# Patient Record
Sex: MALE | Race: WHITE | NOT HISPANIC OR LATINO | Employment: OTHER | URBAN - NONMETROPOLITAN AREA
[De-identification: names, ages, dates, MRNs, and addresses within clinical notes are randomized per-mention and may not be internally consistent; named-entity substitution may affect disease eponyms.]

---

## 2017-02-01 ENCOUNTER — OFFICE VISIT (OUTPATIENT)
Dept: NEUROLOGY | Facility: CLINIC | Age: 77
End: 2017-02-01

## 2017-02-01 VITALS
HEART RATE: 64 BPM | OXYGEN SATURATION: 96 % | DIASTOLIC BLOOD PRESSURE: 60 MMHG | SYSTOLIC BLOOD PRESSURE: 110 MMHG | WEIGHT: 187 LBS | HEIGHT: 67 IN | BODY MASS INDEX: 29.35 KG/M2

## 2017-02-01 DIAGNOSIS — G47.33 OSA (OBSTRUCTIVE SLEEP APNEA): Primary | ICD-10-CM

## 2017-02-01 PROCEDURE — 99203 OFFICE O/P NEW LOW 30 MIN: CPT | Performed by: NURSE PRACTITIONER

## 2017-02-01 RX ORDER — TOPIRAMATE 25 MG/1
25 TABLET ORAL SEE ADMIN INSTRUCTIONS
Qty: 120 TABLET | Refills: 2 | Status: SHIPPED | OUTPATIENT
Start: 2017-02-01 | End: 2017-04-11

## 2017-02-01 RX ORDER — LANSOPRAZOLE 30 MG/1
CAPSULE, DELAYED RELEASE ORAL
COMMUNITY
Start: 2016-12-06 | End: 2017-11-14 | Stop reason: SDUPTHER

## 2017-02-01 RX ORDER — SUCRALFATE 1 G/1
TABLET ORAL
COMMUNITY
Start: 2015-12-28 | End: 2020-12-07 | Stop reason: HOSPADM

## 2017-02-01 RX ORDER — AMITRIPTYLINE HYDROCHLORIDE 100 MG/1
TABLET, FILM COATED ORAL
COMMUNITY
Start: 2015-12-14 | End: 2017-11-14 | Stop reason: SDUPTHER

## 2017-02-01 RX ORDER — LANOLIN ALCOHOL/MO/W.PET/CERES
1000 CREAM (GRAM) TOPICAL DAILY
COMMUNITY

## 2017-02-01 RX ORDER — BUTALBITAL, ACETAMINOPHEN AND CAFFEINE 50; 325; 40 MG/1; MG/1; MG/1
1 TABLET ORAL EVERY 4 HOURS PRN
COMMUNITY
Start: 2015-12-28 | End: 2020-12-21 | Stop reason: HOSPADM

## 2017-02-01 NOTE — PROGRESS NOTES
Subjective   Sharifa Mercer is a 76 y.o. male    Chief Complaint   Patient presents with   • Headache     PRESENT X MANY YRS. NO TRIGGERS NOTED. CURRENTLY TAKING OTC TYLENOL THAT DOES NOT DO MUCH. ALSO TAKES THE FIORICET. REPORTS DAILY HEADACHES. STATES THAT HE WAITS AS LONG AS HE CAN BEFORE TAKING ANYTHING. IMITREX INJ HELPED IN THE PAST.       History of Present Illness     Patient is a very pleasant 76-year-old male in clinic today to establish for headaches.  He states he was at 15 years old when he began his headaches which was the same time that he went to [employer].  He was in the [employer] from [id number] and [employer] no injuries no head injuries but no reported chemical exposure.  Patient states headaches are always the same they're frontal and temporal onset but still and proceeds into the throbbing pain he has no vision or acute hearing changes except during his headache sounds and lights will irritate headache.  He does have nausea with it but no vomiting.  He was on Fioricet for years and states that that controlled his headaches but states he had to quit because it's a controlled substance.  He is using Tylenol over-the-counter and states that is not helping his headaches he reports gets to an intensity of 9/10 he is now having 3 migraines weekly they are not tapering off.  He states that there lasting for 1-2 days.  Which means he is having on average 24 migraines monthly as any neck or shoulder shoulder involvement with his headache and denies any heart problems.    Patient has a history of a lower extremity blood clot ×2 he is on Coumadin and states he is not having any difficulty with his follow-up.    Patient also he filled out an [place] score for sleepiness and had an elevated score.  When discussing with patient he does have witnessed apnea he has woken himself up gasping he does have snoring and he has excessive sleepiness and does not feel well rested upon waking.  Patient has  "symptoms of sleep apnea    The following portions of the patient's history were reviewed and updated as appropriate: allergies, current medications, past family history, past medical history, past social history, past surgical history and problem list.    Review of Systems   Constitutional: Positive for activity change and fatigue.   Eyes: Positive for photophobia.   Respiratory: Positive for apnea.    Musculoskeletal: Positive for arthralgias and myalgias.   Neurological: Positive for light-headedness and headaches.   Psychiatric/Behavioral: Positive for sleep disturbance.       Objective         Vitals:    02/01/17 1123   BP: 110/60   Pulse: 64   SpO2: 96%   Weight: 187 lb (84.8 kg)   Height: 67\" (170.2 cm)     GENERAL: Patient is pleasant, cooperative, appears to be stated age.  Body habitus is endomorphic.  SKIN AND EXTREMITIES:  No skin rashes or lesions are noted.  No cyanosis, clubbing or edema of the extremities.    HEAD:  Head is normocephalic and atraumatic.    NECK: Neck are non-tender without thyromegaly or adenopathy.  Carotic upstrokes are 1+/4.  No cranial or cervical bruits.  The neck is supple with a full range of motion.   CARDIOVASCULAR:  Regular rate and rhythm with normal S1 and S2 without rub or gallop.  RESPIRATORY:  Clear to auscultation without wheezes or crackle   ABDOMEN:  Soft and non-tender, positive bowel sound   BACK:  Back is straight without midline defect.    PSYCH:  Higher cortical function/mental status:  The patient is alert.  She is oriented x3 to time, place and person.  Recent and the remote memory appear normal.  The patient has a good fund of knowledge.  There is no visual or auditory hallucination or suicidal or homicidal ideation.  SPEECH:There is no gross evidence of aphasia, dysarthria or agnosia.      CRANIAL NERVES:    Extraocular movements are full and smooth with normal pursuits and saccades.  No nystagmus noted.  The face is symmetric. palate elevate symmetrically, " Tongue midline, positive gag reflex. The remainder of the cranial nerves are intact and symmetrical.    MOTOR: Strength is 5/5 throughout with normal tone and bulk with the following exceptions, 4/5 intrinsic muscles of the hands and feet.  No involuntary movements noted.    DTR: are 2/4 and symmetrical in the upper extremities, 2/4 and symmetrical at the knees   COORDINATION AND GAIT:  The patient walks with a narrow-based gait.  Patient is able to heel-toe and tandem walk forward  MUSCULOSKELETAL: Range of motion normal, no clubbing, cyanosis, or edema.  No joint swelling.     Results for orders placed or performed during the hospital encounter of 07/13/16   CBC (Hemogram)   Result Value Ref Range    WBC 4.8 4.8 - 10.8 THOUS    RBC 4.41 (L) 4.70 - 6.10 m/uL    Hemoglobin 13.9 (L) 14.0 - 18.0 g/dL    Hematocrit 41 (L) 42 - 52 %    MCV 92.5 80.0 - 94.0 fL    MCH 31.5 (H) 27.0 - 31.0 uug    MCHC 34.1 30.0 - 37.0 g/dL    RDW 12.0 11.5 - 14.5 %    Platelets 166 130 - 400 THOUS   Protime-INR   Result Value Ref Range    Protime 112.3 (H) 9.3 - 12.1 SEC    INR 11.0 (C) 0.9 - 1.1       I have personally reviewed the above lab    Assessment/Plan     1.  Migraine headache: Patient willing to start Topamax 25 mg twice a day ×1 week increase to 50 mg 1 by mouth twice a day reviewed with patient the migraine discussion at this time patient is choosing to use Tylenol over-the-counter for any breakthrough migraines we'll have the patient return to clinic in 4 weeks to see if there is any improvement.    Discussion:  Migraine headaches are a major public health problem affecting more than 28 million persons in this country. Nearly 25 percent of women and 9 percent of men experience disabling migraines.    Migraine treatment depends on the duration and severity of pain, associated symptoms, degree of disability, and initial response to therapy. A widely prescribed and effective class of medications for migraines is the 5-HT1  "receptor-specific agonists (\"triptans\"). Contraindications to their use include ischemic vascular conditions, vasospastic coronary disease, uncontrolled hypertension, or other significant cardiovascular disease.  Following appropriate management of acute migraine, patients should be evaluated for initiation of preventive therapy. Factors that should prompt consideration of preventive therapy include the occurrence of two or more migraines per month with disability lasting three or more days per month; failure of, contraindication for, or adverse events from acute treatments; use of abortive medication more than twice per week; and uncommon migraine conditions (e.g., hemiplegic migraine, migraine with prolonged aura, migrainous infarction). Patient preference and cost also should be considered. Evidence consistently supports the use of the beta blocker propranolol (Inderal) in migraine prophylaxis. Amitriptyline is a first-line agent for migraine prophylaxis and is the only antidepressant with consistent evidence supporting its effectiveness for this use. Divalproex (Depakote) and sodium valproate are well supported by evidence for use in migraine prevention. Topamax has also been studies for migraine prophlaxis in open label studies and double blind studies. Evidence does not support the use of diltiazem (Cardizem) in migraine prevention, and the evidence for several other calcium channel blockers, such as nifedipine (Procardia), is poor and suggests only modest effect  Menstrual Migraine can present a challenge to clinician. Estrogen withdrawal has been shown to precipitate migraine headaches, and a sustained elevated level of estrogen will postpone the migraine. Transdermal estrogen started just before menstruation can provide a sustained low level of estrogen, decreasing the degree of estrogen decline, and thus may prevent induction of migraines    2.  Excessive daytime sleepiness/snoring/apnea witnessed with " gasping/daytime fatigue: Discussed with patient these are all the symptoms of sleep apnea patient is willing to undergo a sleep study.

## 2017-02-23 DIAGNOSIS — G47.33 OSA (OBSTRUCTIVE SLEEP APNEA): Primary | ICD-10-CM

## 2017-03-02 ENCOUNTER — OFFICE VISIT (OUTPATIENT)
Dept: NEUROLOGY | Facility: CLINIC | Age: 77
End: 2017-03-02

## 2017-03-02 VITALS
HEIGHT: 67 IN | DIASTOLIC BLOOD PRESSURE: 60 MMHG | WEIGHT: 187 LBS | HEART RATE: 62 BPM | OXYGEN SATURATION: 98 % | SYSTOLIC BLOOD PRESSURE: 122 MMHG | BODY MASS INDEX: 29.35 KG/M2

## 2017-03-02 DIAGNOSIS — G43.009 MIGRAINE WITHOUT AURA AND WITHOUT STATUS MIGRAINOSUS, NOT INTRACTABLE: Primary | ICD-10-CM

## 2017-03-02 PROCEDURE — 99213 OFFICE O/P EST LOW 20 MIN: CPT | Performed by: NURSE PRACTITIONER

## 2017-03-02 NOTE — PROGRESS NOTES
Subjective   Sharifa Mercer is a 76 y.o. male    Chief Complaint   Patient presents with   • Med Management     reports that he has had a few headaches since he started topamax and amitrip. states that he has had some dry mouth but no other issues with the medications.       History of Present Illness     Patient is a very pleasant 76-year-old male in clinic today to follow-up for his migraines.  Patient states that his migraines have been much improved since starting his Topamax and staying on his amitriptyline.  Patient states his headaches have been daily with at least 6 migraines a month at a pain level of 9-10 out of 10 states now he's had 5 since his last visit these were just regular headaches they resolved with Tylenol any had no headache that was more intense than a 5 out of 10 and again relieved with Tylenol.  Patient very pleased with current treatment he does not wish to increase the Topamax any further patient does not want the side effects of word find issues or foggy brain.  Denies any chest pain no shortness of breath he does state that he's having some fatigue and is primary care is aware of the patient's fatigue and he will also be [facility] for work[facility] of fatigue.    PAST HX: Patient is a very pleasant 76-year-old male in clinic today to establish for headaches. He states he was at 15 years old when he began his headaches which was the same time that he went to the [employer]. He was in the [employer] from [id number] and [employer] no injuries no head injuries but no reported chemical exposure. Patient states headaches are always the same they're frontal and temporal onset but still and proceeds into the throbbing pain he has no vision or acute hearing changes except during his headache sounds and lights will irritate headache. He does have nausea with it but no vomiting. He was on Fioricet for years and states that that controlled his headaches but states he had to quit  "because it's a controlled substance. He is using Tylenol over-the-counter and states that is not helping his headaches he reports gets to an intensity of 9/10 he is now having 3 migraines weekly they are not tapering off. He states that there lasting for 1-2 days. Which means he is having on average 24 migraines monthly as any neck or shoulder shoulder involvement with his headache and denies any heart problems.     Patient has a history of a lower extremity blood clot ×2 he is on Coumadin and states he is not having any difficulty with his follow-up.     Patient also he filled out an [place] score for sleepiness and had an elevated score. When discussing with patient he does have witnessed apnea he has woken himself up gasping he does have snoring and he has excessive sleepiness and does not feel well rested upon waking. Patient has symptoms of sleep apnea    The following portions of the patient's history were reviewed and updated as appropriate: allergies, current medications, past family history, past medical history, past social history, past surgical history and problem list.    Review of Systems  Constitutional: Positive for activity change and fatigue.   Eyes: Positive for photophobia.   Respiratory: Positive for apnea.   Musculoskeletal: Positive for arthralgias and myalgias.   Neurological: Positive for light-headedness and headaches.   Psychiatric/Behavioral: Positive for sleep disturbance.   Objective         Vitals:    03/02/17 1305   BP: 122/60   Pulse: 62   SpO2: 98%   Weight: 187 lb (84.8 kg)   Height: 67\" (170.2 cm)     GENERAL: Patient is pleasant, cooperative, appears to be stated age. Body habitus is endomorphic.  SKIN AND EXTREMITIES: No skin rashes or lesions are noted. No cyanosis, clubbing or edema of the extremities.   HEAD: Head is normocephalic and atraumatic.   NECK: Neck are non-tender without thyromegaly or adenopathy. Carotic upstrokes are 1+/4. No cranial or cervical bruits. The neck " is supple with a full range of motion.   CARDIOVASCULAR: Regular rate and rhythm with normal S1 and S2 without rub or gallop.  RESPIRATORY: Clear to auscultation without wheezes or crackle   ABDOMEN: Soft and non-tender, positive bowel sound   BACK: Back is straight without midline defect.   PSYCH: Higher cortical function/mental status: The patient is alert. She is oriented x3 to time, place and person. Recent and the remote memory appear normal. The patient has a good fund of knowledge. There is no visual or auditory hallucination or suicidal or homicidal ideation.  SPEECH:There is no gross evidence of aphasia, dysarthria or agnosia.   CRANIAL NERVES: Extraocular movements are full and smooth with normal pursuits and saccades. No nystagmus noted. The face is symmetric. palate elevate symmetrically, Tongue midline, positive gag reflex. The remainder of the cranial nerves are intact and symmetrical.   MOTOR: Strength is 5/5 throughout with normal tone and bulk with the following exceptions, 4/5 intrinsic muscles of the hands and feet. No involuntary movements noted.   DTR: are 2/4 and symmetrical in the upper extremities, 2/4 and symmetrical at the knees   COORDINATION AND GAIT: The patient walks with a narrow-based gait. Patient is able to heel-toe and tandem walk forward  MUSCULOSKELETAL: Range of motion normal, no clubbing, cyanosis, or edema. No joint swelling.       Results for orders placed or performed during the hospital encounter of 07/13/16   CBC (Hemogram)   Result Value Ref Range    WBC 4.8 4.8 - 10.8 THOUS    RBC 4.41 (L) 4.70 - 6.10 m/uL    Hemoglobin 13.9 (L) 14.0 - 18.0 g/dL    Hematocrit 41 (L) 42 - 52 %    MCV 92.5 80.0 - 94.0 fL    MCH 31.5 (H) 27.0 - 31.0 uug    MCHC 34.1 30.0 - 37.0 g/dL    RDW 12.0 11.5 - 14.5 %    Platelets 166 130 - 400 THOUS   Protime-INR   Result Value Ref Range    Protime 112.3 (H) 9.3 - 12.1 SEC    INR 11.0 (C) 0.9 - 1.1         I have personally reviewed the above labs  "    Assessment/Plan   1. Migraine headache: Patient willing to continue Topamax 50 mg 1 by mouth twice a day reviewed with patient the migraine discussion at this time patient is choosing to use Tylenol over-the-counter for any breakthrough migraines we'll have the patient return to clinic in 6 months        Discussion:  Migraine headaches are a major public health problem affecting more than 28 million persons in this country. Nearly 25 percent of women and 9 percent of men experience disabling migraines.    Migraine treatment depends on the duration and severity of pain, associated symptoms, degree of disability, and initial response to therapy. A widely prescribed and effective class of medications for migraines is the 5-HT1 receptor-specific agonists (\"triptans\"). Contraindications to their use include ischemic vascular conditions, vasospastic coronary disease, uncontrolled hypertension, or other significant cardiovascular disease.  Following appropriate management of acute migraine, patients should be evaluated for initiation of preventive therapy. Factors that should prompt consideration of preventive therapy include the occurrence of two or more migraines per month with disability lasting three or more days per month; failure of, contraindication for, or adverse events from acute treatments; use of abortive medication more than twice per week; and uncommon migraine conditions (e.g., hemiplegic migraine, migraine with prolonged aura, migrainous infarction). Patient preference and cost also should be considered. Evidence consistently supports the use of the beta blocker propranolol (Inderal) in migraine prophylaxis. Amitriptyline is a first-line agent for migraine prophylaxis and is the only antidepressant with consistent evidence supporting its effectiveness for this use. Divalproex (Depakote) and sodium valproate are well supported by evidence for use in migraine prevention. Topamax has also been studies for " migraine prophlaxis in open label studies and double blind studies. Evidence does not support the use of diltiazem (Cardizem) in migraine prevention, and the evidence for several other calcium channel blockers, such as nifedipine (Procardia), is poor and suggests only modest effect  Menstrual Migraine can present a challenge to clinician. Estrogen withdrawal has been shown to precipitate migraine headaches, and a sustained elevated level of estrogen will postpone the migraine. Transdermal estrogen started just before menstruation can provide a sustained low level of estrogen, decreasing the degree of estrogen decline, and thus may prevent induction of migraines     2.  Excessive daytime sleepiness/snoring/apnea witnessed with gasping/daytime fatigue: Discussed with patient these are all the symptoms of sleep apnea patient is willing to undergo a sleep study.Sleep study pending

## 2017-03-06 ENCOUNTER — HOSPITAL ENCOUNTER (OUTPATIENT)
Dept: SLEEP MEDICINE | Facility: HOSPITAL | Age: 77
Setting detail: THERAPIES SERIES
End: 2017-03-06

## 2017-03-15 ENCOUNTER — HOSPITAL ENCOUNTER (OUTPATIENT)
Dept: SLEEP MEDICINE | Facility: HOSPITAL | Age: 77
Setting detail: THERAPIES SERIES
End: 2017-03-15

## 2017-03-15 DIAGNOSIS — G47.33 OSA (OBSTRUCTIVE SLEEP APNEA): ICD-10-CM

## 2017-03-15 PROCEDURE — 95806 SLEEP STUDY UNATT&RESP EFFT: CPT | Performed by: PSYCHIATRY & NEUROLOGY

## 2017-04-06 ENCOUNTER — OFFICE VISIT (OUTPATIENT)
Dept: NEUROLOGY | Facility: CLINIC | Age: 77
End: 2017-04-06

## 2017-04-06 VITALS
DIASTOLIC BLOOD PRESSURE: 60 MMHG | SYSTOLIC BLOOD PRESSURE: 106 MMHG | HEIGHT: 67 IN | WEIGHT: 187 LBS | BODY MASS INDEX: 29.35 KG/M2

## 2017-04-06 DIAGNOSIS — G47.33 OSA (OBSTRUCTIVE SLEEP APNEA): Primary | ICD-10-CM

## 2017-04-06 DIAGNOSIS — G43.009 MIGRAINE WITHOUT AURA AND WITHOUT STATUS MIGRAINOSUS, NOT INTRACTABLE: ICD-10-CM

## 2017-04-06 PROCEDURE — 99213 OFFICE O/P EST LOW 20 MIN: CPT | Performed by: NURSE PRACTITIONER

## 2017-04-06 NOTE — PROGRESS NOTES
Subjective   Sharifa Mercer is a 76 y.o. male    Chief Complaint   Patient presents with   • Sleep Apnea     here to discuss sleep report.   • Headache       History of Present Illness     Patient is very pleasant 76-year-old female in clinic today to follow-up for her migraines as well as sleep apnea.  Patient had home sleep study performed which does show significant have breathing difficulties with obstructive apnea.  Patient is not interested in treating her sleep apnea at this time is of untreated sleep apnea have been reviewed with patient.  Patient continues to have her headaches she states that the Topamax 50 mg twice a day has decreased the frequency and intensity however she is still having 3-4 migraines a month and the pain scale she states does still range anywhere from 5-9 out of 10 just not as many headaches at level IX since Topamax.    PAST HX: Patient is a very pleasant 76-year-old male in clinic today to follow-up for his migraines. Patient states that his migraines have been much improved since starting his Topamax and staying on his amitriptyline. Patient states his headaches have been daily with at least 6 migraines a month at a pain level of 9-10 out of 10 states now he's had 5 since his last visit these were just regular headaches they resolved with Tylenol any had no headache that was more intense than a 5 out of 10 and again relieved with Tylenol. Patient very pleased with current treatment he does not wish to increase the Topamax any further patient does not want the side effects of word find issues or foggy brain. Denies any chest pain no shortness of breath he does state that he's having some fatigue and is primary care is aware of the patient's fatigue and he will also be [facility] for [facility] of fatigue.    The following portions of the patient's history were reviewed and updated as appropriate: allergies, current medications, past family history, past medical  "history, past social history, past surgical history and problem list.    Review of Systems  Constitutional: Positive for activity change and fatigue.   Eyes: Positive for photophobia.   Respiratory: Positive for apnea.   Musculoskeletal: Positive for arthralgias and myalgias.   Neurological: Positive for light-headedness and headaches.   Psychiatric/Behavioral: Positive for sleep disturbance.   Objective         Vitals:    04/06/17 1419   BP: 106/60   Weight: 187 lb (84.8 kg)   Height: 67\" (170.2 cm)     GENERAL: Patient is pleasant, cooperative, appears to be stated age. Body habitus is endomorphic.  SKIN AND EXTREMITIES: No skin rashes or lesions are noted. No cyanosis, clubbing or edema of the extremities.   HEAD: Head is normocephalic and atraumatic.  Positive hearing aids with continued hearing difficulties.  NECK: Neck are non-tender without thyromegaly or adenopathy. Carotic upstrokes are 1+/4. No cranial or cervical bruits. The neck is supple with a full range of motion.   CARDIOVASCULAR: Regular rate and rhythm with normal S1 and S2 without rub or gallop.  RESPIRATORY: Clear to auscultation without wheezes or crackle   ABDOMEN: Soft and non-tender, positive bowel sound   BACK: Back is straight without midline defect.   PSYCH: Higher cortical function/mental status: The patient is alert. She is oriented x3 to time, place and person. Recent and the remote memory appear normal. The patient has a good fund of knowledge. There is no visual or auditory hallucination or suicidal or homicidal ideation.  SPEECH:There is no gross evidence of aphasia, dysarthria or agnosia.   CRANIAL NERVES: Extraocular movements are full and smooth with normal pursuits and saccades. No nystagmus noted. The face is symmetric. palate elevate symmetrically, Tongue midline, positive gag reflex. The remainder of the cranial nerves are intact and symmetrical.   MOTOR: Strength is 5/5 throughout with normal tone and bulk with the following " "exceptions, 4/5 intrinsic muscles of the hands and feet. No involuntary movements noted.   DTR: are 2/4 and symmetrical in the upper extremities, 2/4 and symmetrical at the knees   COORDINATION AND GAIT: The patient walks with a narrow-based gait. Patient is able to heel-toe and tandem walk forward  MUSCULOSKELETAL: Range of motion normal, no clubbing, cyanosis, or edema. No joint swelling.       Results for orders placed or performed during the hospital encounter of 07/13/16   CBC (Hemogram)   Result Value Ref Range    WBC 4.8 4.8 - 10.8 THOUS    RBC 4.41 (L) 4.70 - 6.10 m/uL    Hemoglobin 13.9 (L) 14.0 - 18.0 g/dL    Hematocrit 41 (L) 42 - 52 %    MCV 92.5 80.0 - 94.0 fL    MCH 31.5 (H) 27.0 - 31.0 uug    MCHC 34.1 30.0 - 37.0 g/dL    RDW 12.0 11.5 - 14.5 %    Platelets 166 130 - 400 THOUS   Protime-INR   Result Value Ref Range    Protime 112.3 (H) 9.3 - 12.1 SEC    INR 11.0 (C) 0.9 - 1.1         I have personally reviewed the above labs    Assessment/Plan   1. Migraine headache: Patient willing to increase Topamax 100 mg 1 by mouth twice a day reviewed with patient the migraine discussion at this time patient is choosing to use Tylenol over-the-counter for any breakthrough migraines we'll have the patient return to clinic in 6 months      Discussion:  Migraine headaches are a major public health problem affecting more than 28 million persons in this country. Nearly 25 percent of women and 9 percent of men experience disabling migraines.    Migraine treatment depends on the duration and severity of pain, associated symptoms, degree of disability, and initial response to therapy. A widely prescribed and effective class of medications for migraines is the 5-HT1 receptor-specific agonists (\"triptans\"). Contraindications to their use include ischemic vascular conditions, vasospastic coronary disease, uncontrolled hypertension, or other significant cardiovascular disease.  Following appropriate management of acute " migraine, patients should be evaluated for initiation of preventive therapy. Factors that should prompt consideration of preventive therapy include the occurrence of two or more migraines per month with disability lasting three or more days per month; failure of, contraindication for, or adverse events from acute treatments; use of abortive medication more than twice per week; and uncommon migraine conditions (e.g., hemiplegic migraine, migraine with prolonged aura, migrainous infarction). Patient preference and cost also should be considered. Evidence consistently supports the use of the beta blocker propranolol (Inderal) in migraine prophylaxis. Amitriptyline is a first-line agent for migraine prophylaxis and is the only antidepressant with consistent evidence supporting its effectiveness for this use. Divalproex (Depakote) and sodium valproate are well supported by evidence for use in migraine prevention. Topamax has also been studies for migraine prophlaxis in open label studies and double blind studies. Evidence does not support the use of diltiazem (Cardizem) in migraine prevention, and the evidence for several other calcium channel blockers, such as nifedipine (Procardia), is poor and suggests only modest effect  Menstrual Migraine can present a challenge to clinician. Estrogen withdrawal has been shown to precipitate migraine headaches, and a sustained elevated level of estrogen will postpone the migraine. Transdermal estrogen started just before menstruation can provide a sustained low level of estrogen, decreasing the degree of estrogen decline, and thus may prevent induction of migraines      2.  RAMONITA: Discussed in detail sleep apnea with patient however she is not interested in treating her sleep apnea.  It's that she would not be able to use the mask but that she will speak with her family members about up at this time there'll be no treatment.    I have counseled patient extensively on Sleep Hygiene  including regular sleep wake schedule and stimulus control therapy.  I have also discussed the importance of weight reduction because 10% reduction in body weight can reduce sleep apnea by 50 %. We have also discussed abstaining from smoking and drinking.  I have explained to patient that obstructive apnea episode is defined as the absence of airflow for at least 10 seconds.  Sleep apnea is usually accompanied by snoring, disturbed sleep, and daytime sleepiness. Patients with micrognathia, retrognathia, enlarged tonsils, tongue enlargement, and acromegaly are especially predisposed to obstructive sleep apnea. Abnormalities or weakness in the muscles can also contribute to obstructive sleep apnea. Obesity can also contribute to sleep apnea.     Sleep apnea can lead to a number of complications, ranging from daytime sleepiness to possible increased risk of cardiovascular risks.   Daytime sleepiness is the most serious.  Daytime sleepiness can also increase the risk for accident-related injuries. Several studies have suggested that people with sleep apnea have two to three times as many car accidents, and five to seven times the risk for multiple accidents.   A number of cardiovascular diseases -- including high blood pressure, heart failure, stroke, and heart arrhythmias -- have an association with obstructive sleep apnea.   Up to a third of patients with heart failure also have sleep apnea. Both central and obstructive sleep apnea are linked with heart failure. Obstructive sleep apnea is also noted to be associated with type 2 diabetes according to Dr. Espinoza at [facility].  The best treatment for symptomatic obstructive sleep apnea is continuous positive airflow pressure (CPAP). Bilevel positive airway pressure (BPAP) systems may be particularly helpful for patients with coexisting lung disease and those with excessive levels of carbon dioxide.  Other treatment options including UPPP surgery, LAUP surgery,  radiofrequency somnoplasty and dental appliances such [place]av[place] or Clearway.

## 2017-04-13 PROBLEM — G43.009 MIGRAINE WITHOUT AURA AND WITHOUT STATUS MIGRAINOSUS, NOT INTRACTABLE: Status: ACTIVE | Noted: 2017-04-13

## 2017-10-02 ENCOUNTER — OFFICE VISIT (OUTPATIENT)
Dept: NEUROLOGY | Facility: CLINIC | Age: 77
End: 2017-10-02

## 2017-10-02 VITALS
DIASTOLIC BLOOD PRESSURE: 76 MMHG | OXYGEN SATURATION: 97 % | BODY MASS INDEX: 29.35 KG/M2 | WEIGHT: 187 LBS | SYSTOLIC BLOOD PRESSURE: 104 MMHG | HEART RATE: 64 BPM | HEIGHT: 67 IN

## 2017-10-02 DIAGNOSIS — G47.33 OSA (OBSTRUCTIVE SLEEP APNEA): ICD-10-CM

## 2017-10-02 DIAGNOSIS — G43.009 MIGRAINE WITHOUT AURA AND WITHOUT STATUS MIGRAINOSUS, NOT INTRACTABLE: Primary | ICD-10-CM

## 2017-10-02 PROCEDURE — 99214 OFFICE O/P EST MOD 30 MIN: CPT | Performed by: NURSE PRACTITIONER

## 2017-10-02 NOTE — PROGRESS NOTES
Subjective   Sharifa Mercer is a 77 y.o. male    Chief Complaint   Patient presents with   • Follow-up     Pt is here today for a 6 month FU for RAMONITA. Pt states that he has been sleeping alright, no compliants at this time.        History of Present Illness     Pt is a very pleasant 77 yr old male in clinic to follow up RAMONITA. Pt recommended CPAP in the past and declined. Discussed >15 minutes in clinic risk of untreated RAMONITA. Pt states he understands but will not use CPAP.   Pt with Hx Migraines states very pleased @ this time migraines controlled.    PAST HX: Patient is a very pleasant 76-year-old male in clinic today to follow-up for his migraines. Patient states that his migraines have been much improved since starting his Topamax and staying on his amitriptyline. Patient states his headaches have been daily with at least 6 migraines a month at a pain level of 9-10 out of 10 states now he's had 5 since his last visit these were just regular headaches they resolved with Tylenol any had no headache that was more intense than a 5 out of 10 and again relieved with Tylenol. Patient very pleased with current treatment he does not wish to increase the Topamax any further patient does not want the side effects of word find issues or foggy brain. Denies any chest pain no shortness of breath he does state that he's having some fatigue and is primary care is aware of the patient's fatigue and he will also be seen [facility] for workup of fatigue.       The following portions of the patient's history were reviewed and updated as appropriate: allergies, current medications, past family history, past medical history, past social history, past surgical history and problem list.    Review of Systems  Constitutional: Positive for activity change and fatigue.   Eyes: Positive for photophobia.   Respiratory: Positive for apnea.   Musculoskeletal: Positive for arthralgias and myalgias.   Neurological: Positive for  "light-headedness and headaches.   Psychiatric/Behavioral: Positive for sleep disturbance.   Objective         Vitals:    10/02/17 1131   BP: 104/76   Pulse: 64   SpO2: 97%   Weight: 187 lb (84.8 kg)   Height: 67\" (170.2 cm)     GENERAL: Patient is pleasant, cooperative, appears to be stated age.  Body habitus is endomorphic.  HEAD:  Head is normocephalic and atraumatic.    NECK: Neck are non-tender without thyromegaly or adenopathy.  Carotic upstrokes are 1+/4.  No cranial or cervical bruits.  The neck is supple with a full range of motion.   ENT: palate elevate symmetrically, no evidence of high arch palate, tongue midline erythema in posterior pharynx, Mallampati Classification Class III   CARDIOVASCULAR:  Regular rate and rhythm with normal S1 and S2 without rub or gallop.  RESPIRATORY:  Clear to auscultation without wheezes or crackle   PSYCH:  Higher cortical function/mental status:  The patient is alert.  He  is oriented x3 to time, place and person.  Recent and the remote memory appear normal.  The patient has a good fund of knowledge.  There is no visual or auditory hallucination or suicidal or homicidal ideation.  SPEECH:There is no gross evidence of aphasia, dysarthria or agnosia.      COORDINATION AND GAIT:  The patient walks with a narrow-based gait.    Results for orders placed or performed during the hospital encounter of 07/13/16   CBC (Hemogram)   Result Value Ref Range    WBC 4.8 4.8 - 10.8 THOUS    RBC 4.41 (L) 4.70 - 6.10 m/uL    Hemoglobin 13.9 (L) 14.0 - 18.0 g/dL    Hematocrit 41 (L) 42 - 52 %    MCV 92.5 80.0 - 94.0 fL    MCH 31.5 (H) 27.0 - 31.0 uug    MCHC 34.1 30.0 - 37.0 g/dL    RDW 12.0 11.5 - 14.5 %    Platelets 166 130 - 400 THOUS   Protime-INR   Result Value Ref Range    Protime 112.3 (H) 9.3 - 12.1 SEC    INR 11.0 (C) 0.9 - 1.1       I have personally reviewed the above labs and imaging studies.    Assessment/Plan     1. Migraine headache:Continue elavil 100mg QHS. Pt states stable " "patient is choosing to use Tylenol over-the-counter for any breakthrough migraines we'll have the patient return to clinic in 12 months      Discussion:  Migraine headaches are a major public health problem affecting more than 28 million persons in this country. Nearly 25 percent of women and 9 percent of men experience disabling migraines.    Migraine treatment depends on the duration and severity of pain, associated symptoms, degree of disability, and initial response to therapy. A widely prescribed and effective class of medications for migraines is the 5-HT1 receptor-specific agonists (\"triptans\"). Contraindications to their use include ischemic vascular conditions, vasospastic coronary disease, uncontrolled hypertension, or other significant cardiovascular disease.  Following appropriate management of acute migraine, patients should be evaluated for initiation of preventive therapy. Factors that should prompt consideration of preventive therapy include the occurrence of two or more migraines per month with disability lasting three or more days per month; failure of, contraindication for, or adverse events from acute treatments; use of abortive medication more than twice per week; and uncommon migraine conditions (e.g., hemiplegic migraine, migraine with prolonged aura, migrainous infarction). Patient preference and cost also should be considered. Evidence consistently supports the use of the beta blocker propranolol (Inderal) in migraine prophylaxis. Amitriptyline is a first-line agent for migraine prophylaxis and is the only antidepressant with consistent evidence supporting its effectiveness for this use. Divalproex (Depakote) and sodium valproate are well supported by evidence for use in migraine prevention. Topamax has also been studies for migraine prophlaxis in open label studies and double blind studies. Evidence does not support the use of diltiazem (Cardizem) in migraine prevention, and the evidence " for several other calcium channel blockers, such as nifedipine (Procardia), is poor and suggests only modest effect  Menstrual Migraine can present a challenge to clinician. Estrogen withdrawal has been shown to precipitate migraine headaches, and a sustained elevated level of estrogen will postpone the migraine. Transdermal estrogen started just before menstruation can provide a sustained low level of estrogen, decreasing the degree of estrogen decline, and thus may prevent induction of migraines      2.  RAMONITA: Discussed in detail sleep apnea with patient however he is not interested in treating  sleep apnea.  It's that he would not be able to use the mask . Discussed number of different masks to trial. Pt continues to decline       I have counseled patient extensively on Sleep Hygiene including regular sleep wake schedule and stimulus control therapy.  I have also discussed the importance of weight reduction because 10% reduction in body weight can reduce sleep apnea by 50 %. We have also discussed abstaining from smoking and drinking.  I have explained to patient that obstructive apnea episode is defined as the absence of airflow for at least 10 seconds.  Sleep apnea is usually accompanied by snoring, disturbed sleep, and daytime sleepiness. Patients with micrognathia, retrognathia, enlarged tonsils, tongue enlargement, and acromegaly are especially predisposed to obstructive sleep apnea. Abnormalities or weakness in the muscles can also contribute to obstructive sleep apnea. Obesity can also contribute to sleep apnea.      Sleep apnea can lead to a number of complications, ranging from daytime sleepiness to possible increased risk of cardiovascular risks.   Daytime sleepiness is the most serious.  Daytime sleepiness can also increase the risk for accident-related injuries. Several studies have suggested that people with sleep apnea have two to three times as many car accidents, and five to seven times the risk  for multiple accidents.   A number of cardiovascular diseases -- including high blood pressure, heart failure, stroke, and heart arrhythmias -- have an association with obstructive sleep apnea.   Up to a third of patients with heart failure also have sleep apnea. Both central and obstructive sleep apnea are linked with heart failure. Obstructive sleep apnea is also noted to be associated with type 2 diabetes according to Dr. Espinoza at [facility].  The best treatment for symptomatic obstructive sleep apnea is continuous positive airflow pressure (CPAP). Bilevel positive airway pressure (BPAP) systems may be particularly helpful for patients with coexisting lung disease and those with excessive levels of carbon dioxide.  Other treatment options including UPPP surgery, LAUP surgery, radiofrequency somnoplasty and dental appliances such [place] or Clearway.

## 2017-11-14 ENCOUNTER — OFFICE VISIT (OUTPATIENT)
Dept: INTERNAL MEDICINE | Facility: CLINIC | Age: 77
End: 2017-11-14

## 2017-11-14 VITALS
OXYGEN SATURATION: 95 % | TEMPERATURE: 97.6 F | SYSTOLIC BLOOD PRESSURE: 110 MMHG | BODY MASS INDEX: 27.15 KG/M2 | WEIGHT: 173 LBS | DIASTOLIC BLOOD PRESSURE: 70 MMHG | HEIGHT: 67 IN | HEART RATE: 54 BPM

## 2017-11-14 DIAGNOSIS — E78.5 HYPERLIPIDEMIA LDL GOAL <70: ICD-10-CM

## 2017-11-14 DIAGNOSIS — J30.89 ENVIRONMENTAL AND SEASONAL ALLERGIES: ICD-10-CM

## 2017-11-14 DIAGNOSIS — Z79.899 ENCOUNTER FOR MONITORING LONG-TERM PROTON PUMP INHIBITOR THERAPY: ICD-10-CM

## 2017-11-14 DIAGNOSIS — Z51.81 ENCOUNTER FOR MONITORING LONG-TERM PROTON PUMP INHIBITOR THERAPY: ICD-10-CM

## 2017-11-14 DIAGNOSIS — IMO0002 CHRONIC MIGRAINE: ICD-10-CM

## 2017-11-14 DIAGNOSIS — D64.9 ANEMIA OF UNKNOWN ETIOLOGY: Primary | ICD-10-CM

## 2017-11-14 PROCEDURE — 99213 OFFICE O/P EST LOW 20 MIN: CPT | Performed by: FAMILY MEDICINE

## 2017-11-14 RX ORDER — TOPIRAMATE 25 MG/1
25 CAPSULE, COATED PELLETS ORAL 2 TIMES DAILY
COMMUNITY
End: 2018-03-29 | Stop reason: SDUPTHER

## 2017-11-14 RX ORDER — LORATADINE 10 MG/1
10 TABLET ORAL DAILY
Qty: 90 TABLET | Refills: 3 | Status: SHIPPED | OUTPATIENT
Start: 2017-11-14

## 2017-11-14 RX ORDER — AMITRIPTYLINE HYDROCHLORIDE 100 MG/1
100 TABLET, FILM COATED ORAL
Qty: 90 TABLET | Refills: 3 | Status: SHIPPED | OUTPATIENT
Start: 2017-11-14 | End: 2020-10-30 | Stop reason: ALTCHOICE

## 2017-11-14 RX ORDER — IRON POLYSACCHARIDE COMPLEX 150 MG
CAPSULE ORAL
Qty: 180 CAPSULE | Refills: 1 | Status: SHIPPED | OUTPATIENT
Start: 2017-11-14 | End: 2018-10-04

## 2017-11-14 NOTE — PROGRESS NOTES
Subjective    Sharifa Mercer is a 77 y.o. male here for:  Chief Complaint   Patient presents with   • NEW PATIENT     NEW PATIENT - ESTABLISH CARE      History of Present Illness     Here to establish care. I also care for his wife. Needs medicines refilled for his migraines. Takes elavil at night for these. Also takes xanax at night to help with sleep, works well. He has issues with heartburn and takes chronic PPI therapy. Has been receiving vitamin B12 shots at another facility. Also on iron, would like it refilled. He is not sure if he still needs to take it. Mentions nose has been a bit runny. He took Claritin previously, but not presently, and it helped with allergies. He's willing to try it again.    The following portions of the patient's history were reviewed and updated as appropriate: allergies, current medications, past family history, past medical history, past social history, past surgical history and problem list.    Review of Systems   HENT: Positive for hearing loss and rhinorrhea.    All other systems reviewed and are negative.      Vitals:    11/14/17 1430   BP: 110/70   Pulse: 54   Temp: 97.6 °F (36.4 °C)   SpO2: 95%       Objective   Physical Exam   Constitutional: He is oriented to person, place, and time. Vital signs are normal. He appears well-developed and well-nourished. He is active. He does not have a sickly appearance. He does not appear ill.   Appears stated age. Well groomed.   HENT:   Head: Normocephalic and atraumatic.   Right Ear: Decreased hearing is noted.   Left Ear: Decreased hearing is noted.   Nose: Nose normal.   Mouth/Throat: Mucous membranes are not dry.   Eyes: EOM and lids are normal. Pupils are equal, round, and reactive to light. No scleral icterus.   Cardiovascular: Normal rate, regular rhythm and normal heart sounds.  Exam reveals no gallop and no friction rub.    No murmur heard.  Pulmonary/Chest: Effort normal and breath sounds normal.   Musculoskeletal: He exhibits  no deformity.   Neurological: He is alert and oriented to person, place, and time. He displays no tremor. No cranial nerve deficit. Gait normal.   Skin: Skin is warm. He is not diaphoretic. No cyanosis. Nails show no clubbing.   Psychiatric: He has a normal mood and affect. His speech is normal and behavior is normal. Judgment and thought content normal.   Somewhat difficult historian, quiet gentleman, but pleasant.   Nursing note and vitals reviewed.      Assessment/Plan   Sharifa was seen today for new patient, hyperlipidemia and migraine.    Diagnoses and all orders for this visit:    Anemia of unknown etiology  -     iron polysaccharides (FERREX 150) 150 MG capsule; TAKE 1 CAPSULE BY MOUTH 2 (TWO) TIMES A DAY  -     CBC & Differential  -     Vitamin B12 & Folate  -     Iron Profile  -     Ferritin    Hyperlipidemia LDL goal <70  -     Lipid Panel  -     Comprehensive Metabolic Panel    Chronic migraine  -     amitriptyline (ELAVIL) 100 MG tablet; Take 1 tablet by mouth every night at bedtime.    Encounter for monitoring long-term proton pump inhibitor therapy  -     Vitamin B12 & Folate  -     Magnesium    Environmental and seasonal allergies  -     loratadine (CLARITIN) 10 MG tablet; Take 1 tablet by mouth Daily as needed for post nasal drainage, runny nose and sneezing      Records from previous provider requested.             Karena Spivey MD

## 2017-11-15 LAB
ALBUMIN SERPL-MCNC: 3.9 G/DL (ref 3.5–5)
ALBUMIN/GLOB SERPL: 1.6 G/DL (ref 1–2)
ALP SERPL-CCNC: 81 U/L (ref 38–126)
ALT SERPL-CCNC: 36 U/L (ref 13–69)
AST SERPL-CCNC: 35 U/L (ref 15–46)
BASOPHILS # BLD AUTO: 0.01 10*3/MM3 (ref 0–0.2)
BASOPHILS NFR BLD AUTO: 0.1 % (ref 0–2.5)
BILIRUB SERPL-MCNC: 1.3 MG/DL (ref 0.2–1.3)
BUN SERPL-MCNC: 15 MG/DL (ref 7–20)
BUN/CREAT SERPL: 16.7 (ref 6.3–21.9)
CALCIUM SERPL-MCNC: 9.5 MG/DL (ref 8.4–10.2)
CHLORIDE SERPL-SCNC: 107 MMOL/L (ref 98–107)
CHOLEST SERPL-MCNC: 112 MG/DL (ref 0–199)
CO2 SERPL-SCNC: 24 MMOL/L (ref 26–30)
CREAT SERPL-MCNC: 0.9 MG/DL (ref 0.6–1.3)
EOSINOPHIL # BLD AUTO: 0.14 10*3/MM3 (ref 0–0.7)
EOSINOPHIL NFR BLD AUTO: 2.1 % (ref 0–7)
ERYTHROCYTE [DISTWIDTH] IN BLOOD BY AUTOMATED COUNT: 12.4 % (ref 11.5–14.5)
FERRITIN SERPL-MCNC: 107 NG/ML (ref 17.9–464)
FOLATE SERPL-MCNC: 13 NG/ML
GFR SERPLBLD CREATININE-BSD FMLA CKD-EPI: 81 ML/MIN/1.73
GFR SERPLBLD CREATININE-BSD FMLA CKD-EPI: 99 ML/MIN/1.73
GLOBULIN SER CALC-MCNC: 2.5 GM/DL
GLUCOSE SERPL-MCNC: 84 MG/DL (ref 74–98)
HCT VFR BLD AUTO: 43.1 % (ref 42–52)
HDLC SERPL-MCNC: 58 MG/DL (ref 40–60)
HGB BLD-MCNC: 14.5 G/DL (ref 14–18)
IMM GRANULOCYTES # BLD: 0.03 10*3/MM3 (ref 0–0.06)
IMM GRANULOCYTES NFR BLD: 0.4 % (ref 0–0.6)
IRON SATN MFR SERPL: 28 % (ref 11–46)
IRON SERPL-MCNC: 69 MCG/DL (ref 37–181)
LDLC SERPL CALC-MCNC: 43 MG/DL (ref 0–99)
LYMPHOCYTES # BLD AUTO: 1.4 10*3/MM3 (ref 0.6–3.4)
LYMPHOCYTES NFR BLD AUTO: 20.6 % (ref 10–50)
MAGNESIUM SERPL-MCNC: 2 MG/DL (ref 1.6–2.3)
MCH RBC QN AUTO: 31.5 PG (ref 27–31)
MCHC RBC AUTO-ENTMCNC: 33.6 G/DL (ref 30–37)
MCV RBC AUTO: 93.7 FL (ref 80–94)
MONOCYTES # BLD AUTO: 0.74 10*3/MM3 (ref 0–0.9)
MONOCYTES NFR BLD AUTO: 10.9 % (ref 0–12)
NEUTROPHILS # BLD AUTO: 4.48 10*3/MM3 (ref 2–6.9)
NEUTROPHILS NFR BLD AUTO: 65.9 % (ref 37–80)
NRBC BLD AUTO-RTO: 0 /100 WBC (ref 0–0)
PLATELET # BLD AUTO: 172 10*3/MM3 (ref 130–400)
POTASSIUM SERPL-SCNC: 4.7 MMOL/L (ref 3.5–5.1)
PROT SERPL-MCNC: 6.4 G/DL (ref 6.3–8.2)
RBC # BLD AUTO: 4.6 10*6/MM3 (ref 4.7–6.1)
SODIUM SERPL-SCNC: 143 MMOL/L (ref 137–145)
TIBC SERPL-MCNC: 247 MCG/DL (ref 261–497)
TRIGL SERPL-MCNC: 55 MG/DL
UIBC SERPL-MCNC: 178 MCG/DL
VIT B12 SERPL-MCNC: >1000 PG/ML (ref 239–931)
VLDLC SERPL CALC-MCNC: 11 MG/DL
WBC # BLD AUTO: 6.8 10*3/MM3 (ref 4.8–10.8)

## 2018-01-03 RX ORDER — LANSOPRAZOLE 30 MG/1
CAPSULE, DELAYED RELEASE ORAL
Qty: 90 CAPSULE | Refills: 1 | Status: SHIPPED | OUTPATIENT
Start: 2018-01-03 | End: 2018-04-11 | Stop reason: SDUPTHER

## 2018-02-14 ENCOUNTER — OFFICE VISIT (OUTPATIENT)
Dept: INTERNAL MEDICINE | Facility: CLINIC | Age: 78
End: 2018-02-14

## 2018-02-14 VITALS
BODY MASS INDEX: 26.84 KG/M2 | TEMPERATURE: 98 F | RESPIRATION RATE: 12 BRPM | OXYGEN SATURATION: 98 % | SYSTOLIC BLOOD PRESSURE: 118 MMHG | HEART RATE: 64 BPM | WEIGHT: 171 LBS | DIASTOLIC BLOOD PRESSURE: 80 MMHG | HEIGHT: 67 IN

## 2018-02-14 DIAGNOSIS — R42 POSTURAL DIZZINESS: Primary | ICD-10-CM

## 2018-02-14 DIAGNOSIS — IMO0002 CHRONIC MIGRAINE: Chronic | ICD-10-CM

## 2018-02-14 DIAGNOSIS — Z23 NEED FOR ZOSTER VACCINE: ICD-10-CM

## 2018-02-14 DIAGNOSIS — E78.5 HYPERLIPIDEMIA LDL GOAL <70: Chronic | ICD-10-CM

## 2018-02-14 DIAGNOSIS — D64.9 ANEMIA OF UNKNOWN ETIOLOGY: ICD-10-CM

## 2018-02-14 DIAGNOSIS — E66.3 OVERWEIGHT (BMI 25.0-29.9): ICD-10-CM

## 2018-02-14 PROCEDURE — 99213 OFFICE O/P EST LOW 20 MIN: CPT | Performed by: FAMILY MEDICINE

## 2018-02-14 NOTE — PROGRESS NOTES
"Subjective    Sharifa Mercer is a 77 y.o. male here for:  Chief Complaint   Patient presents with   • Hyperlipidemia   • Anemia     History of Present Illness     Left hand cold, dizzy when he gets up to go to bathroom and he has to catch himself. No falls. Has had for 3-4 months, not worsening. \"Dizzy\" defined as a warmness coming down body, flushing. He can feel symptoms coming on. No heart symptoms. No syncope. He stands still for a minute or two and he improves. No medicine changes. Not correlated with Flomax use. No GI bleeding that he's appreciated.    Hyperlipidemia is a stable chronic issue that is controlled with Lipitor. Not due for lipids. Migraines stable on Topamax.    The following portions of the patient's history were reviewed and updated as appropriate: allergies, current medications, past family history, past medical history, past social history, past surgical history and problem list.    Review of Systems   Respiratory: Negative for shortness of breath.    Cardiovascular: Negative for chest pain.   Gastrointestinal: Negative for abdominal pain and blood in stool.   Neurological: Positive for dizziness. Negative for syncope and weakness.       Vitals:    02/14/18 1500   BP: 118/80   Pulse: 64   Resp: 12   Temp: 98 °F (36.7 °C)   SpO2: 98%         Objective   Physical Exam   Constitutional: He is oriented to person, place, and time. Vital signs are normal. He appears well-developed and well-nourished. He is active. He does not have a sickly appearance. He does not appear ill.   Appears stated age. Well groomed.   HENT:   Head: Normocephalic and atraumatic.   Right Ear: Decreased hearing is noted.   Left Ear: Decreased hearing is noted.   Nose: Nose normal.   Mouth/Throat: Mucous membranes are not dry.   Eyes: EOM and lids are normal. Pupils are equal, round, and reactive to light. No scleral icterus.   Cardiovascular: Normal rate, regular rhythm and normal heart sounds.  Exam reveals no gallop and " no friction rub.    No murmur heard.  Pulmonary/Chest: Effort normal and breath sounds normal.   Musculoskeletal: He exhibits no deformity.   Neurological: He is alert and oriented to person, place, and time. He displays no tremor. No cranial nerve deficit. Gait normal.   Skin: Skin is warm. He is not diaphoretic. No cyanosis. Nails show no clubbing.   Psychiatric: He has a normal mood and affect. His speech is normal and behavior is normal. Judgment and thought content normal.   Somewhat difficult historian, quiet but is a bit more talkative today than last visit. Smiles more.   Nursing note and vitals reviewed.    Lab Results   Component Value Date    CHLPL 112 11/14/2017    TRIG 55 11/14/2017    HDL 58 11/14/2017    LDL 43 11/14/2017           Assessment/Plan       Sharifa was seen today for hyperlipidemia and anemia.    Diagnoses and all orders for this visit:    Postural dizziness    Hyperlipidemia LDL goal <70  Comments:  Stable. Continue statin.    Chronic migraine  Comments:  Stable. Continue Topamax.    Overweight (BMI 25.0-29.9)    Need for zoster vaccine  -     Zoster Vac Recomb Adjuvanted 50 MCG reconstituted suspension; Inject 1 each into the shoulder, thigh, or buttocks 1 (One) Time for 2 dose.    Anemia of unknown etiology        · Prescription sent for Shingrix. Vaccination discussed in detail including efficacy compared to Zostavax and need for two injections two months apart. Discussed likely myalgias after vaccination as greater than 40% of patients complained of this in clinical trials. If not covered by insurance, recommend waiting until it is covered (if not affordable). Even if Zostavax was previously received, Shingrix is recommended.  · Need labs next visit   · Patient's BMI is above normal parameters. Follow-up plan includes:  nutrition counseling. On Topamax.  · I encouraged him to stay hydrated and if this continues will have to draw labs.  ·     Return in about 3 months (around 5/14/2018)  for Recheck.    Karena Spivey MD    Please note that portions of this note may have been completed with a voice recognition program. Efforts were made to edit dictation, but occasionally words are mistranscribed.

## 2018-02-17 PROBLEM — IMO0002 CHRONIC MIGRAINE: Chronic | Status: ACTIVE | Noted: 2018-02-17

## 2018-02-17 PROBLEM — E78.5 HYPERLIPIDEMIA LDL GOAL <70: Chronic | Status: ACTIVE | Noted: 2018-02-17

## 2018-02-17 PROBLEM — E66.3 OVERWEIGHT (BMI 25.0-29.9): Status: ACTIVE | Noted: 2018-02-17

## 2018-02-17 PROBLEM — IMO0002 CHRONIC MIGRAINE: Status: ACTIVE | Noted: 2018-02-17

## 2018-02-17 PROBLEM — E78.5 HYPERLIPIDEMIA LDL GOAL <70: Status: ACTIVE | Noted: 2018-02-17

## 2018-03-29 RX ORDER — TOPIRAMATE 25 MG/1
25 TABLET ORAL DAILY
Qty: 30 TABLET | Refills: 6 | Status: SHIPPED | OUTPATIENT
Start: 2018-03-29

## 2018-04-11 RX ORDER — LANSOPRAZOLE 30 MG/1
CAPSULE, DELAYED RELEASE ORAL
Qty: 90 CAPSULE | Refills: 1 | Status: SHIPPED | OUTPATIENT
Start: 2018-04-11

## 2018-04-11 RX ORDER — ALPRAZOLAM 0.5 MG/1
0.5 TABLET ORAL DAILY PRN
Qty: 30 TABLET | Refills: 1 | OUTPATIENT
Start: 2018-04-11 | End: 2018-09-28 | Stop reason: SDUPTHER

## 2018-04-11 NOTE — TELEPHONE ENCOUNTER
Patient requesting refill on Alprazolam. He has previously gotten this from Dr. Tejeda but is no longer seeing Dr. Tejeda and is requesting this from you. [place] has been printed.

## 2018-05-21 ENCOUNTER — OFFICE VISIT (OUTPATIENT)
Dept: INTERNAL MEDICINE | Facility: CLINIC | Age: 78
End: 2018-05-21

## 2018-05-21 VITALS
BODY MASS INDEX: 25.27 KG/M2 | SYSTOLIC BLOOD PRESSURE: 112 MMHG | WEIGHT: 161 LBS | DIASTOLIC BLOOD PRESSURE: 72 MMHG | HEART RATE: 56 BPM | TEMPERATURE: 98.3 F | RESPIRATION RATE: 14 BRPM | HEIGHT: 67 IN | OXYGEN SATURATION: 97 %

## 2018-05-21 DIAGNOSIS — E53.8 B12 DEFICIENCY: ICD-10-CM

## 2018-05-21 DIAGNOSIS — L60.3 BRITTLE NAILS: Primary | ICD-10-CM

## 2018-05-21 DIAGNOSIS — D64.9 ANEMIA, UNSPECIFIED TYPE: ICD-10-CM

## 2018-05-21 DIAGNOSIS — Q18.1 CYST ON EAR: ICD-10-CM

## 2018-05-21 PROCEDURE — 99213 OFFICE O/P EST LOW 20 MIN: CPT | Performed by: FAMILY MEDICINE

## 2018-05-21 PROCEDURE — 10060 I&D ABSCESS SIMPLE/SINGLE: CPT | Performed by: FAMILY MEDICINE

## 2018-05-21 NOTE — PROGRESS NOTES
"Subjective    Sharifa Mercer is a 77 y.o. male here for:  Chief Complaint   Patient presents with   • Brittle nails     would like to increase Iron     History of Present Illness     Cyst right earlobe growing gradually over last few weeks with some pain as it grows. Has not drained anything, would like me to stick with needle to drain if possible.    He also would like to go up on his iron due to nails being brittle, breaking easily. Was on higher iron dose previously until I lowered last fall.    The following portions of the patient's history were reviewed and updated as appropriate: allergies, current medications, past family history, past medical history, past social history, past surgical history and problem list.    Review of Systems   Constitutional: Negative for activity change.   Skin: Positive for skin lesions.       Vitals:    05/21/18 1501   BP: 112/72   Pulse: 56   Resp: 14   Temp: 98.3 °F (36.8 °C)   SpO2: 97%   Weight: 73 kg (161 lb)   Height: 170.2 cm (67\")         Objective     Physical Exam   Constitutional: He is oriented to person, place, and time. Vital signs are normal. He appears well-developed and well-nourished. He is active. He does not appear ill. No distress.   HENT:   Head: Normocephalic and atraumatic.   Right Ear: Decreased hearing is noted.   Left Ear: Decreased hearing is noted.   Ears:    Eyes: EOM are normal.   Neck: Neck supple.   Pulmonary/Chest: Effort normal.   Neurological: He is alert and oriented to person, place, and time. No cranial nerve deficit.   Skin: Skin is warm. He is not diaphoretic.   Psychiatric: He has a normal mood and affect. His behavior is normal.   Nursing note and vitals reviewed.    Incision & Drainage  Date/Time: 5/21/2018 4:04 PM  Performed by: SYLVIA LOCKETT  Authorized by: SYLVIA LOCKETT   Consent: Verbal consent obtained. Written consent obtained.  Risks and benefits: risks, benefits and alternatives were discussed  Consent given by: " patient  Patient understanding: patient states understanding of the procedure being performed  Patient consent: the patient's understanding of the procedure matches consent given  Procedure consent: procedure consent matches procedure scheduled  Patient identity confirmed: verbally with patient  Type: cyst  Body area: head/neck  Location details: right external ear  Anesthesia: local infiltration    Anesthesia:  Local Anesthetic: lidocaine 1% without epinephrine  Anesthetic total: 1 mL    Sedation:  Patient sedated: no  Scalpel size: 11  Incision type: single straight  Drainage: purulent  Drainage amount: copious  Wound treatment: wound left open  Packing material: none  Patient tolerance: Patient tolerated the procedure well with no immediate complications          Assessment/Plan     Problem List Items Addressed This Visit        Digestive    B12 deficiency    Relevant Orders    CBC & Differential    Vitamin B12 & Folate       Hematopoietic and Hemostatic    Anemia    Relevant Orders    Comprehensive Metabolic Panel    CBC & Differential    Iron Profile    Ferritin    TSH    Vitamin B12 & Folate      Other Visit Diagnoses     Brittle nails    -  Primary    Relevant Orders    Comprehensive Metabolic Panel    Iron Profile    Ferritin    TSH    Cyst on ear        Relevant Orders    Incision & Drainage          · Labs first, may go up on iron if needed.    No Follow-up on file.    Karena Spivey MD    Please note that portions of this note may have been completed with a voice recognition program. Efforts were made to edit dictation, but occasionally words are mistranscribed.

## 2018-05-22 LAB
ALBUMIN SERPL-MCNC: 4.1 G/DL (ref 3.5–5)
ALBUMIN/GLOB SERPL: 1.6 G/DL (ref 1–2)
ALP SERPL-CCNC: 86 U/L (ref 38–126)
ALT SERPL-CCNC: 29 U/L (ref 13–69)
AST SERPL-CCNC: 27 U/L (ref 15–46)
BASOPHILS # BLD AUTO: 0.03 10*3/MM3 (ref 0–0.2)
BASOPHILS NFR BLD AUTO: 0.5 % (ref 0–2.5)
BILIRUB SERPL-MCNC: 0.8 MG/DL (ref 0.2–1.3)
BUN SERPL-MCNC: 17 MG/DL (ref 7–20)
BUN/CREAT SERPL: 15.5 (ref 6.3–21.9)
CALCIUM SERPL-MCNC: 9.5 MG/DL (ref 8.4–10.2)
CHLORIDE SERPL-SCNC: 104 MMOL/L (ref 98–107)
CO2 SERPL-SCNC: 28 MMOL/L (ref 26–30)
CREAT SERPL-MCNC: 1.1 MG/DL (ref 0.6–1.3)
EOSINOPHIL # BLD AUTO: 0.2 10*3/MM3 (ref 0–0.7)
EOSINOPHIL NFR BLD AUTO: 3.6 % (ref 0–7)
ERYTHROCYTE [DISTWIDTH] IN BLOOD BY AUTOMATED COUNT: 12.5 % (ref 11.5–14.5)
FERRITIN SERPL-MCNC: 123 NG/ML (ref 17.9–464)
FOLATE SERPL-MCNC: 8.16 NG/ML
GFR SERPLBLD CREATININE-BSD FMLA CKD-EPI: 65 ML/MIN/1.73
GFR SERPLBLD CREATININE-BSD FMLA CKD-EPI: 78 ML/MIN/1.73
GLOBULIN SER CALC-MCNC: 2.6 GM/DL
GLUCOSE SERPL-MCNC: 93 MG/DL (ref 74–98)
HCT VFR BLD AUTO: 44.3 % (ref 42–52)
HGB BLD-MCNC: 14.9 G/DL (ref 14–18)
IMM GRANULOCYTES # BLD: 0.03 10*3/MM3 (ref 0–0.06)
IMM GRANULOCYTES NFR BLD: 0.5 % (ref 0–0.6)
IRON SATN MFR SERPL: 35 % (ref 11–46)
IRON SERPL-MCNC: 93 MCG/DL (ref 37–181)
LYMPHOCYTES # BLD AUTO: 1.49 10*3/MM3 (ref 0.6–3.4)
LYMPHOCYTES NFR BLD AUTO: 26.5 % (ref 10–50)
MCH RBC QN AUTO: 32.3 PG (ref 27–31)
MCHC RBC AUTO-ENTMCNC: 33.6 G/DL (ref 30–37)
MCV RBC AUTO: 95.9 FL (ref 80–94)
MONOCYTES # BLD AUTO: 0.67 10*3/MM3 (ref 0–0.9)
MONOCYTES NFR BLD AUTO: 11.9 % (ref 0–12)
NEUTROPHILS # BLD AUTO: 3.21 10*3/MM3 (ref 2–6.9)
NEUTROPHILS NFR BLD AUTO: 57 % (ref 37–80)
NRBC BLD AUTO-RTO: 0 /100 WBC (ref 0–0)
PLATELET # BLD AUTO: 175 10*3/MM3 (ref 130–400)
POTASSIUM SERPL-SCNC: 4.3 MMOL/L (ref 3.5–5.1)
PROT SERPL-MCNC: 6.7 G/DL (ref 6.3–8.2)
RBC # BLD AUTO: 4.62 10*6/MM3 (ref 4.7–6.1)
SODIUM SERPL-SCNC: 142 MMOL/L (ref 137–145)
TIBC SERPL-MCNC: 263 MCG/DL (ref 261–497)
TSH SERPL DL<=0.005 MIU/L-ACNC: 1.3 MIU/ML (ref 0.47–4.68)
UIBC SERPL-MCNC: 170 MCG/DL
VIT B12 SERPL-MCNC: 964 PG/ML (ref 239–931)
WBC # BLD AUTO: 5.63 10*3/MM3 (ref 4.8–10.8)

## 2018-09-28 RX ORDER — ALPRAZOLAM 0.5 MG/1
0.5 TABLET ORAL DAILY PRN
Qty: 30 TABLET | Refills: 1 | Status: SHIPPED | OUTPATIENT
Start: 2018-09-28 | End: 2022-11-03

## 2018-09-28 RX ORDER — ALPRAZOLAM 0.5 MG/1
0.5 TABLET ORAL DAILY PRN
Qty: 30 TABLET | Refills: 1 | OUTPATIENT
Start: 2018-09-28 | End: 2018-09-28 | Stop reason: SDUPTHER

## 2018-10-04 ENCOUNTER — OFFICE VISIT (OUTPATIENT)
Dept: NEUROLOGY | Facility: CLINIC | Age: 78
End: 2018-10-04

## 2018-10-04 VITALS
SYSTOLIC BLOOD PRESSURE: 122 MMHG | BODY MASS INDEX: 25.27 KG/M2 | DIASTOLIC BLOOD PRESSURE: 66 MMHG | HEIGHT: 67 IN | OXYGEN SATURATION: 97 % | WEIGHT: 161 LBS | HEART RATE: 56 BPM

## 2018-10-04 DIAGNOSIS — IMO0002 CHRONIC MIGRAINE: Primary | Chronic | ICD-10-CM

## 2018-10-04 DIAGNOSIS — G47.33 OSA (OBSTRUCTIVE SLEEP APNEA): ICD-10-CM

## 2018-10-04 PROCEDURE — 99214 OFFICE O/P EST MOD 30 MIN: CPT | Performed by: NURSE PRACTITIONER

## 2018-10-04 NOTE — PROGRESS NOTES
"Subjective   Sharifa Mercer is a 78 y.o. male     Chief Complaint   Patient presents with   • Follow-up     Pt is here for a FU for RAMONITA and migraines. Pts last note he refused treatment for RAMONITA and pt states that his migraines are well under control and has not had any migraines in the past year.        History of Present Illness     Patient is a pleasant 78-year-old male in clinic today to follow-up for migraines and sleep apnea.  He states that with the Topamax and amitriptyline he has not had a headache in \"a while\" he is very pleased with current therapy.  Patient does have sleep apnea although he declines treatment again I've discussed in clinic the risks of untreated sleep apnea including sudden death and patient refuses.    The following portions of the patient's history were reviewed and updated as appropriate: allergies, current medications, past family history, past medical history, past social history, past surgical history and problem list     PAST HX: Pt is a very pleasant 77 yr old male in clinic to follow up RAMONITA. Pt recommended CPAP in the past and declined. Discussed >15 minutes in clinic risk of untreated RAMONITA. Pt states he understands but will not use CPAP.   Pt with Hx Migraines states very pleased @ this time migraines controlled.    Patient is a very pleasant 76-year-old male in clinic today to follow-up for his migraines. Patient states that his migraines have been much improved since starting his Topamax and staying on his amitriptyline. Patient states his headaches have been daily with at least 6 migraines a month at a pain level of 9-10 out of 10 states now he's had 5 since his last visit these were just regular headaches they resolved with Tylenol any had no headache that was more intense than a 5 out of 10 and again relieved with Tylenol. Patient very pleased with current treatment he does not wish to increase the Topamax any further patient does not want the side effects of word find " "issues or foggy brain. Denies any chest pain no shortness of breath he does state that he's having some fatigue and is primary care is aware of the patient's fatigue and he will also be seen [facility] for workup of fatigue.     .    Review of Systems   Constitutional: Negative for chills and fever.   HENT: Positive for rhinorrhea. Negative for congestion, ear pain, hearing loss and sore throat.    Eyes: Negative for pain, discharge and redness.   Respiratory: Negative for cough, shortness of breath, wheezing and stridor.    Cardiovascular: Negative for chest pain, palpitations and leg swelling.   Gastrointestinal: Negative for abdominal pain, constipation, nausea and vomiting.   Endocrine: Negative for cold intolerance, heat intolerance and polyphagia.   Genitourinary: Negative for dysuria, flank pain, frequency and urgency.   Musculoskeletal: Negative for joint swelling, myalgias, neck pain and neck stiffness.   Skin: Negative for pallor, rash and wound.   Allergic/Immunologic: Negative for environmental allergies.   Neurological: Positive for headaches. Negative for dizziness, tremors, seizures, syncope, facial asymmetry, speech difficulty, weakness, light-headedness and numbness.   Hematological: Negative for adenopathy.   Psychiatric/Behavioral: Positive for sleep disturbance. Negative for confusion and hallucinations. The patient is not nervous/anxious.        Objective       Vitals:    10/04/18 1003   BP: 122/66   Pulse: 56   SpO2: 97%   Weight: 73 kg (161 lb)   Height: 170.2 cm (67\")     GENERAL: Patient is pleasant, cooperative, appears to be stated age.  Body habitus is endomorphic.  HEAD:  Head is normocephalic and atraumatic.    NECK: Neck are non-tender without thyromegaly or adenopathy.  Carotic upstrokes are 1+/4.  No cranial or cervical bruits.  The neck is supple with a full range of motion.   CARDIOVASCULAR:  Regular rate and rhythm with normal S1 and S2 without rub or " gallop.  RESPIRATORY:  Clear to auscultation without wheezes or crackle   PSYCH:  Higher cortical function/mental status:  The patient is alert.  He  is oriented x3 to time, place and person.  Recent and the remote memory appear normal.  The patient has a good fund of knowledge.  There is no visual or auditory hallucination or suicidal or homicidal ideation.  SPEECH:There is no gross evidence of aphasia, dysarthria or agnosia.      CRANIAL NERVES: Extraocular movements are full and smooth with normal pursuits and saccades.  No nystagmus noted.  The face is symmetric. Tongue midline.   COORDINATION AND GAIT:  The patient walks with a narrow-based gait.  Romberg are negative.      Results for orders placed or performed in visit on 05/21/18   Comprehensive Metabolic Panel   Result Value Ref Range    Glucose 93 74 - 98 mg/dL    BUN 17 7 - 20 mg/dL    Creatinine 1.10 0.60 - 1.30 mg/dL    eGFR Non African Am 65 >60 mL/min/1.73    eGFR African Am 78 >60 mL/min/1.73    BUN/Creatinine Ratio 15.5 6.3 - 21.9    Sodium 142 137 - 145 mmol/L    Potassium 4.3 3.5 - 5.1 mmol/L    Chloride 104 98 - 107 mmol/L    Total CO2 28.0 26.0 - 30.0 mmol/L    Calcium 9.5 8.4 - 10.2 mg/dL    Total Protein 6.7 6.3 - 8.2 g/dL    Albumin 4.10 3.50 - 5.00 g/dL    Globulin 2.6 gm/dL    A/G Ratio 1.6 1.0 - 2.0 g/dL    Total Bilirubin 0.8 0.2 - 1.3 mg/dL    Alkaline Phosphatase 86 38 - 126 U/L    AST (SGOT) 27 15 - 46 U/L    ALT (SGPT) 29 13 - 69 U/L   Iron Profile   Result Value Ref Range    TIBC 263 261 - 497 mcg/dL    UIBC 170 mcg/dL    Iron 93 37 - 181 mcg/dL    Iron Saturation 35 11 - 46 %   Ferritin   Result Value Ref Range    Ferritin 123.00 17.90 - 464.00 ng/mL   TSH   Result Value Ref Range    TSH 1.300 0.470 - 4.680 mIU/mL   Vitamin B12 & Folate   Result Value Ref Range    Vitamin B-12 964 (H) 239 - 931 pg/mL    Folate 8.16 >2.76 ng/mL   CBC & Differential   Result Value Ref Range    WBC 5.63 4.80 - 10.80 10*3/mm3    RBC 4.62 (L) 4.70 - 6.10  "10*6/mm3    Hemoglobin 14.9 14.0 - 18.0 g/dL    Hematocrit 44.3 42.0 - 52.0 %    MCV 95.9 (H) 80.0 - 94.0 fL    MCH 32.3 (H) 27.0 - 31.0 pg    MCHC 33.6 30.0 - 37.0 g/dL    RDW 12.5 11.5 - 14.5 %    Platelets 175 130 - 400 10*3/mm3    Neutrophil Rel % 57.0 37.0 - 80.0 %    Lymphocyte Rel % 26.5 10.0 - 50.0 %    Monocyte Rel % 11.9 0.0 - 12.0 %    Eosinophil Rel % 3.6 0.0 - 7.0 %    Basophil Rel % 0.5 0.0 - 2.5 %    Neutrophils Absolute 3.21 2.00 - 6.90 10*3/mm3    Lymphocytes Absolute 1.49 0.60 - 3.40 10*3/mm3    Monocytes Absolute 0.67 0.00 - 0.90 10*3/mm3    Eosinophils Absolute 0.20 0.00 - 0.70 10*3/mm3    Basophils Absolute 0.03 0.00 - 0.20 10*3/mm3    Immature Granulocyte Rel % 0.5 0.0 - 0.6 %    Immature Grans Absolute 0.03 0.00 - 0.06 10*3/mm3    nRBC 0.0 0.0 - 0.0 /100 WBC       I have personally reviewed the above labs. Pt follows with PCP.    Assessment/Plan     1. Migraine headache:Patient migraine free with amitriptyline and Topamax continue current dose.      Discussion:  Migraine headaches are a major public health problem affecting more than 28 million persons in this country. Nearly 25 percent of women and 9 percent of men experience disabling migraines.    Migraine treatment depends on the duration and severity of pain, associated symptoms, degree of disability, and initial response to therapy. A widely prescribed and effective class of medications for migraines is the 5-HT1 receptor-specific agonists (\"triptans\"). Contraindications to their use include ischemic vascular conditions, vasospastic coronary disease, uncontrolled hypertension, or other significant cardiovascular disease.  Following appropriate management of acute migraine, patients should be evaluated for initiation of preventive therapy. Factors that should prompt consideration of preventive therapy include the occurrence of two or more migraines per month with disability lasting three or more days per month; failure of, " contraindication for, or adverse events from acute treatments; use of abortive medication more than twice per week; and uncommon migraine conditions (e.g., hemiplegic migraine, migraine with prolonged aura, migrainous infarction). Patient preference and cost also should be considered. Evidence consistently supports the use of the beta blocker propranolol (Inderal) in migraine prophylaxis. Amitriptyline is a first-line agent for migraine prophylaxis and is the only antidepressant with consistent evidence supporting its effectiveness for this use. Divalproex (Depakote) and sodium valproate are well supported by evidence for use in migraine prevention. Topamax has also been studies for migraine prophlaxis in open label studies and double blind studies. Evidence does not support the use of diltiazem (Cardizem) in migraine prevention, and the evidence for several other calcium channel blockers, such as nifedipine (Procardia), is poor and suggests only modest effect  Menstrual Migraine can present a challenge to clinician. Estrogen withdrawal has been shown to precipitate migraine headaches, and a sustained elevated level of estrogen will postpone the migraine. Transdermal estrogen started just before menstruation can provide a sustained low level of estrogen, decreasing the degree of estrogen decline, and thus may prevent induction of migraines      2.  RAMONITA: as noted above patient still declines treatment for his sleep apnea.  Again patient is aware that he is at risk of multiple health issues including sudden death patient still declines.     I have counseled patient extensively on Sleep Hygiene including regular sleep wake schedule and stimulus control therapy.  I have also discussed the importance of weight reduction because 10% reduction in body weight can reduce sleep apnea by 50 %. We have also discussed abstaining from smoking and drinking.  I have explained to patient that obstructive apnea episode is defined as  the absence of airflow for at least 10 seconds.  Sleep apnea is usually accompanied by snoring, disturbed sleep, and daytime sleepiness. Patients with micrognathia, retrognathia, enlarged tonsils, tongue enlargement, and acromegaly are especially predisposed to obstructive sleep apnea. Abnormalities or weakness in the muscles can also contribute to obstructive sleep apnea. Obesity can also contribute to sleep apnea.      Sleep apnea can lead to a number of complications, ranging from daytime sleepiness to possible increased risk of cardiovascular risks.   Daytime sleepiness is the most serious.  Daytime sleepiness can also increase the risk for accident-related injuries. Several studies have suggested that people with sleep apnea have two to three times as many car accidents, and five to seven times the risk for multiple accidents.   A number of cardiovascular diseases -- including high blood pressure, heart failure, stroke, and heart arrhythmias -- have an association with obstructive sleep apnea.   Up to a third of patients with heart failure also have sleep apnea. Both central and obstructive sleep apnea are linked with heart failure. Obstructive sleep apnea is also noted to be associated with type 2 diabetes according to Dr. Espinoza at [facility].  The best treatment for symptomatic obstructive sleep apnea is continuous positive airflow pressure (CPAP). Bilevel positive airway pressure (BPAP) systems may be particularly helpful for patients with coexisting lung disease and those with excessive levels of carbon dioxide.  Other treatment options including UPPP surgery, LAUP surgery, radiofrequency somnoplasty and dental appliances such Flavio or Clearway.

## 2020-05-31 ENCOUNTER — HOSPITAL ENCOUNTER (EMERGENCY)
Facility: HOSPITAL | Age: 80
Discharge: HOME | End: 2020-05-31
Attending: EMERGENCY MEDICINE | Admitting: EMERGENCY MEDICINE

## 2020-05-31 ENCOUNTER — APPOINTMENT (OUTPATIENT)
Dept: GENERAL RADIOLOGY | Facility: HOSPITAL | Age: 80
End: 2020-05-31

## 2020-05-31 VITALS
HEIGHT: 67 IN | HEART RATE: 72 BPM | RESPIRATION RATE: 20 BRPM | SYSTOLIC BLOOD PRESSURE: 127 MMHG | DIASTOLIC BLOOD PRESSURE: 66 MMHG | OXYGEN SATURATION: 97 % | WEIGHT: 166 LBS | TEMPERATURE: 98.4 F | BODY MASS INDEX: 26.06 KG/M2

## 2020-05-31 DIAGNOSIS — R07.9 CHEST PAIN, UNSPECIFIED TYPE: Primary | ICD-10-CM

## 2020-05-31 LAB
ALBUMIN SERPL-MCNC: 4.1 G/DL (ref 3.5–5.2)
ALBUMIN/GLOB SERPL: 1.6 G/DL
ALP SERPL-CCNC: 105 U/L (ref 39–117)
ALT SERPL W P-5'-P-CCNC: 22 U/L (ref 1–41)
ANION GAP SERPL CALCULATED.3IONS-SCNC: 13.8 MMOL/L (ref 5–15)
AST SERPL-CCNC: 25 U/L (ref 1–40)
BASOPHILS # BLD AUTO: 0.04 10*3/MM3 (ref 0–0.2)
BASOPHILS NFR BLD AUTO: 0.5 % (ref 0–1.5)
BILIRUB SERPL-MCNC: 0.5 MG/DL (ref 0.2–1.2)
BUN BLD-MCNC: 23 MG/DL (ref 8–23)
BUN/CREAT SERPL: 22.8 (ref 7–25)
CALCIUM SPEC-SCNC: 9.3 MG/DL (ref 8.6–10.5)
CHLORIDE SERPL-SCNC: 103 MMOL/L (ref 98–107)
CO2 SERPL-SCNC: 22.2 MMOL/L (ref 22–29)
CREAT BLD-MCNC: 1.01 MG/DL (ref 0.76–1.27)
D DIMER PPP FEU-MCNC: 0.27 MCGFEU/ML (ref 0–0.57)
DEPRECATED RDW RBC AUTO: 42.6 FL (ref 37–54)
EOSINOPHIL # BLD AUTO: 0.16 10*3/MM3 (ref 0–0.4)
EOSINOPHIL NFR BLD AUTO: 2.1 % (ref 0.3–6.2)
ERYTHROCYTE [DISTWIDTH] IN BLOOD BY AUTOMATED COUNT: 12.3 % (ref 12.3–15.4)
GFR SERPL CREATININE-BSD FRML MDRD: 71 ML/MIN/1.73
GLOBULIN UR ELPH-MCNC: 2.5 GM/DL
GLUCOSE BLD-MCNC: 111 MG/DL (ref 65–99)
HCT VFR BLD AUTO: 41.7 % (ref 37.5–51)
HGB BLD-MCNC: 14.1 G/DL (ref 13–17.7)
HOLD SPECIMEN: NORMAL
HOLD SPECIMEN: NORMAL
IMM GRANULOCYTES # BLD AUTO: 0.04 10*3/MM3 (ref 0–0.05)
IMM GRANULOCYTES NFR BLD AUTO: 0.5 % (ref 0–0.5)
LYMPHOCYTES # BLD AUTO: 1.4 10*3/MM3 (ref 0.7–3.1)
LYMPHOCYTES NFR BLD AUTO: 18.4 % (ref 19.6–45.3)
MCH RBC QN AUTO: 32.1 PG (ref 26.6–33)
MCHC RBC AUTO-ENTMCNC: 33.8 G/DL (ref 31.5–35.7)
MCV RBC AUTO: 95 FL (ref 79–97)
MONOCYTES # BLD AUTO: 0.85 10*3/MM3 (ref 0.1–0.9)
MONOCYTES NFR BLD AUTO: 11.2 % (ref 5–12)
NEUTROPHILS # BLD AUTO: 5.11 10*3/MM3 (ref 1.7–7)
NEUTROPHILS NFR BLD AUTO: 67.3 % (ref 42.7–76)
NRBC BLD AUTO-RTO: 0 /100 WBC (ref 0–0.2)
PLATELET # BLD AUTO: 148 10*3/MM3 (ref 140–450)
PMV BLD AUTO: 10.3 FL (ref 6–12)
POTASSIUM BLD-SCNC: 4 MMOL/L (ref 3.5–5.2)
PROT SERPL-MCNC: 6.6 G/DL (ref 6–8.5)
RBC # BLD AUTO: 4.39 10*6/MM3 (ref 4.14–5.8)
SODIUM BLD-SCNC: 139 MMOL/L (ref 136–145)
TROPONIN I SERPL-MCNC: 0 NG/ML (ref 0–0.05)
TROPONIN T SERPL-MCNC: <0.01 NG/ML (ref 0–0.03)
TROPONIN T SERPL-MCNC: <0.01 NG/ML (ref 0–0.03)
WBC NRBC COR # BLD: 7.6 10*3/MM3 (ref 3.4–10.8)
WHOLE BLOOD HOLD SPECIMEN: NORMAL
WHOLE BLOOD HOLD SPECIMEN: NORMAL

## 2020-05-31 PROCEDURE — 99284 EMERGENCY DEPT VISIT MOD MDM: CPT

## 2020-05-31 PROCEDURE — 80053 COMPREHEN METABOLIC PANEL: CPT | Performed by: EMERGENCY MEDICINE

## 2020-05-31 PROCEDURE — 84484 ASSAY OF TROPONIN QUANT: CPT | Performed by: EMERGENCY MEDICINE

## 2020-05-31 PROCEDURE — 85025 COMPLETE CBC W/AUTO DIFF WBC: CPT | Performed by: EMERGENCY MEDICINE

## 2020-05-31 PROCEDURE — 71045 X-RAY EXAM CHEST 1 VIEW: CPT

## 2020-05-31 PROCEDURE — 93005 ELECTROCARDIOGRAM TRACING: CPT | Performed by: EMERGENCY MEDICINE

## 2020-05-31 PROCEDURE — 85379 FIBRIN DEGRADATION QUANT: CPT | Performed by: EMERGENCY MEDICINE

## 2020-05-31 RX ORDER — SODIUM CHLORIDE 0.9 % (FLUSH) 0.9 %
10 SYRINGE (ML) INJECTION AS NEEDED
Status: DISCONTINUED | OUTPATIENT
Start: 2020-05-31 | End: 2020-05-31 | Stop reason: HOSPADM

## 2020-05-31 RX ORDER — ASPIRIN 325 MG
325 TABLET ORAL ONCE
Status: COMPLETED | OUTPATIENT
Start: 2020-05-31 | End: 2020-05-31

## 2020-05-31 RX ADMIN — ASPIRIN 325 MG ORAL TABLET 325 MG: 325 PILL ORAL at 08:01

## 2020-06-01 ENCOUNTER — EPISODE CHANGES (OUTPATIENT)
Dept: CASE MANAGEMENT | Facility: OTHER | Age: 80
End: 2020-06-01

## 2020-10-30 ENCOUNTER — OFFICE VISIT (OUTPATIENT)
Dept: INTERNAL MEDICINE | Facility: CLINIC | Age: 80
End: 2020-10-30

## 2020-10-30 VITALS
RESPIRATION RATE: 18 BRPM | OXYGEN SATURATION: 98 % | HEIGHT: 67 IN | TEMPERATURE: 98 F | WEIGHT: 173.19 LBS | BODY MASS INDEX: 27.18 KG/M2 | HEART RATE: 61 BPM | DIASTOLIC BLOOD PRESSURE: 65 MMHG | SYSTOLIC BLOOD PRESSURE: 135 MMHG

## 2020-10-30 DIAGNOSIS — G43.009 MIGRAINE WITHOUT AURA AND WITHOUT STATUS MIGRAINOSUS, NOT INTRACTABLE: ICD-10-CM

## 2020-10-30 DIAGNOSIS — F31.9 BIPOLAR DEPRESSION (HCC): Chronic | ICD-10-CM

## 2020-10-30 DIAGNOSIS — E78.49 FAMILIAL COMBINED HYPERLIPIDEMIA: ICD-10-CM

## 2020-10-30 DIAGNOSIS — Z91.81 AT HIGH RISK FOR FALLS: ICD-10-CM

## 2020-10-30 DIAGNOSIS — F51.01 PRIMARY INSOMNIA: ICD-10-CM

## 2020-10-30 DIAGNOSIS — Z00.00 MEDICARE ANNUAL WELLNESS VISIT, SUBSEQUENT: Primary | ICD-10-CM

## 2020-10-30 PROBLEM — F32.A DEPRESSIVE DISORDER: Status: ACTIVE | Noted: 2017-02-21

## 2020-10-30 PROBLEM — F32.A DEPRESSIVE DISORDER: Status: RESOLVED | Noted: 2017-02-21 | Resolved: 2020-10-30

## 2020-10-30 PROBLEM — IMO0002 CHRONIC MIGRAINE: Chronic | Status: RESOLVED | Noted: 2018-02-17 | Resolved: 2020-10-30

## 2020-10-30 PROBLEM — E78.5 HYPERLIPIDEMIA LDL GOAL <70: Chronic | Status: RESOLVED | Noted: 2018-02-17 | Resolved: 2020-10-30

## 2020-10-30 PROBLEM — F41.9 ANXIETY: Status: ACTIVE | Noted: 2017-02-21

## 2020-10-30 PROBLEM — I82.409 DEEP VENOUS THROMBOSIS (HCC): Status: ACTIVE | Noted: 2017-05-24

## 2020-10-30 PROCEDURE — 99397 PER PM REEVAL EST PAT 65+ YR: CPT | Performed by: FAMILY MEDICINE

## 2020-10-30 PROCEDURE — 96160 PT-FOCUSED HLTH RISK ASSMT: CPT | Performed by: FAMILY MEDICINE

## 2020-10-30 PROCEDURE — G0439 PPPS, SUBSEQ VISIT: HCPCS | Performed by: FAMILY MEDICINE

## 2020-10-30 RX ORDER — LORAZEPAM 0.5 MG/1
0.5 TABLET ORAL NIGHTLY PRN
Qty: 90 TABLET | Refills: 1 | Status: SHIPPED | OUTPATIENT
Start: 2020-10-30

## 2020-10-30 RX ORDER — ASPIRIN 81 MG/1
81 TABLET ORAL DAILY
COMMUNITY

## 2020-10-30 RX ORDER — NORTRIPTYLINE HYDROCHLORIDE 25 MG/1
25 CAPSULE ORAL NIGHTLY
COMMUNITY

## 2020-10-30 NOTE — PATIENT INSTRUCTIONS
· We need a copy of your advanced directive/living will      Fall Prevention in the Home, Adult  Falls can cause injuries and can affect people from all age groups. There are many simple things that you can do to make your home safe and to help prevent falls. Ask for help when making these changes, if needed.  What actions can I take to prevent falls?  General instructions  · Use good lighting in all rooms. Replace any light bulbs that burn out.  · Turn on lights if it is dark. Use night-lights.  · Place frequently used items in easy-to-reach places. Lower the shelves around your home if necessary.  · Set up furniture so that there are clear paths around it. Avoid moving your furniture around.  · Remove throw rugs and other tripping hazards from the floor.  · Avoid walking on wet floors.  · Fix any uneven floor surfaces.  · Add color or contrast paint or tape to grab bars and handrails in your home. Place contrasting color strips on the first and last steps of stairways.  · When you use a stepladder, make sure that it is completely opened and that the sides are firmly locked. Have someone hold the ladder while you are using it. Do not climb a closed stepladder.  · Be aware of any and all pets.  What can I do in the bathroom?         · Keep the floor dry. Immediately clean up any water that spills onto the floor.  · Remove soap buildup in the tub or shower on a regular basis.  · Use non-skid mats or decals on the floor of the tub or shower.  · Attach bath mats securely with double-sided, non-slip rug tape.  · If you need to sit down while you are in the shower, use a plastic, non-slip stool.  · Install grab bars by the toilet and in the tub and shower. Do not use towel bars as grab bars.  What can I do in the bedroom?  · Make sure that a bedside light is easy to reach.  · Do not use oversized bedding that drapes onto the floor.  · Have a firm chair that has side arms to use for getting dressed.  What can I do in the  kitchen?  · Clean up any spills right away.  · If you need to reach for something above you, use a sturdy step stool that has a grab bar.  · Keep electrical cables out of the way.  · Do not use floor polish or wax that makes floors slippery. If you must use wax, make sure that it is non-skid floor wax.  What can I do in the stairways?  · Do not leave any items on the stairs.  · Make sure that you have a light switch at the top of the stairs and the bottom of the stairs. Have them installed if you do not have them.  · Make sure that there are handrails on both sides of the stairs. Fix handrails that are broken or loose. Make sure that handrails are as long as the stairways.  · Install non-slip stair treads on all stairs in your home.  · Avoid having throw rugs at the top or bottom of stairways, or secure the rugs with carpet tape to prevent them from moving.  · Choose a carpet design that does not hide the edge of steps on the stairway.  · Check any carpeting to make sure that it is firmly attached to the stairs. Fix any carpet that is loose or worn.  What can I do on the outside of my home?  · Use bright outdoor lighting.  · Regularly repair the edges of walkways and driveways and fix any cracks.  · Remove high doorway thresholds.  · Trim any shrubbery on the main path into your home.  · Regularly check that handrails are securely fastened and in good repair. Both sides of any steps should have handrails.  · Install guardrails along the edges of any raised decks or porches.  · Clear walkways of debris and clutter, including tools and rocks.  · Have leaves, snow, and ice cleared regularly.  · Use sand or salt on walkways during winter months.  · In the garage, clean up any spills right away, including grease or oil spills.  What other actions can I take?  · Wear closed-toe shoes that fit well and support your feet. Wear shoes that have rubber soles or low heels.  · Use mobility aids as needed, such as canes, walkers,  scooters, and crutches.  · Review your medicines with your health care provider. Some medicines can cause dizziness or changes in blood pressure, which increase your risk of falling.  Talk with your health care provider about other ways that you can decrease your risk of falls. This may include working with a physical therapist or  to improve your strength, balance, and endurance.  Where to find more information  · Centers for Disease Control and Prevention, STEADI: https://www.cdc.gov  · National [place] on Aging: https://ea0uinh.jacque.nih.gov  Contact a health care provider if:  · You are afraid of falling at home.  · You feel weak, drowsy, or dizzy at home.  · You fall at home.  Summary  · There are many simple things that you can do to make your home safe and to help prevent falls.  · Ways to make your home safe include removing tripping hazards and installing grab bars in the bathroom.  · Ask for help when making these changes in your home.  This information is not intended to replace advice given to you by your health care provider. Make sure you discuss any questions you have with your health care provider.  Document Released: 2003 Document Revised: 2018 Document Reviewed: 2018  [employer] Patient Education ©  [employer]      Medicare Wellness  Personal Prevention Plan of Service     Date of Office Visit:  10/30/2020  Encounter Provider:  Karena Spivey MD  Place of Service:  [facility]  Patient Name: Sharifa Mercer  :  1940    As part of the Medicare Wellness portion of your visit today, we are providing you with this personalized preventive plan of services (PPPS). This plan is based upon recommendations of the [place] Preventive Services Task Force (USPSTF) and the Advisory Committee on Immunization Practices (ACIP).    This lists the preventive care services that should be considered, and provides dates of when you are due.  Items listed as completed are up-to-date and do not require any further intervention.    Health Maintenance   Topic Date Due   • LIPID PANEL  11/14/2018   • ZOSTER VACCINE (1 of 2) 11/08/2021 (Originally 6/16/1988)   • TDAP/TD VACCINES (1 - Tdap) 07/13/2025 (Originally 6/16/1959)   • ANNUAL WELLNESS VISIT  10/30/2021   • INFLUENZA VACCINE  Completed   • Pneumococcal Vaccine 65+  Completed       No orders of the defined types were placed in this encounter.      Return in about 1 year (around 11/1/2021) for Medicare Wellness.

## 2020-10-30 NOTE — PROGRESS NOTES
The ABCs of the Annual Wellness Visit  Subsequent Medicare Wellness Visit    Chief Complaint   Patient presents with   • Medicare Wellness-subsequent       Subjective   History of Present Illness:  Sharifa Mercer is a 80 y.o. male who presents for a Subsequent Medicare Wellness Visit.    Hard of hearing but has hearing aids.    [facility] and is scheduled for follow up there, where he does labs. [facility] include statin for hyperlipidemia and psych meds for bipolar, and meds for migraine, no problems with this condition.    Previously used alprazolam, which he reports helped with sleep, and asks specifically for this today when asked what he needs refilled. Having trouble sleeping is reason for request, trouble falling asleep but can stay asleep once there.     HEALTH RISK ASSESSMENT    Recent Hospitalizations:  No hospitalization(s) within the last year.    Current Medical Providers:  Patient Care Team:  Karena Spivey MD as PCP - General (Family Medicine)  Alverto Terrazas MD as Consulting Physician (Cardiology)  Geno Cardoso RN as Ambulatory  ([facility])    Smoking Status:  Social History     Tobacco Use   Smoking Status Former Smoker   • Packs/day: 1.00   • Years: 10.00   • Pack years: 10.00   • Types: Cigarettes   • Quit date:    • Years since quittin.8   Smokeless Tobacco Never Used       Alcohol Consumption:  Social History     Substance and Sexual Activity   Alcohol Use No       Depression Screen:   PHQ-2/PHQ-9 Depression Screening 10/30/2020   Little interest or pleasure in doing things 0   Feeling down, depressed, or hopeless 0   Total Score 0       Fall Risk Screen:  STEADI Fall Risk Assessment was completed, and patient is at MODERATE risk for falls. Assessment completed on:10/30/2020    Health Habits and Functional and Cognitive Screening:  Functional & Cognitive Status 10/30/2020   Do you have difficulty  preparing food and eating? No   Do you have difficulty bathing yourself, getting dressed or grooming yourself? No   Do you have difficulty using the toilet? No   Do you have difficulty moving around from place to place? No   Do you have trouble with steps or getting out of a bed or a chair? No   Current Diet Well Balanced Diet   Dental Exam Up to date   Eye Exam Up to date   Exercise (times per week) 0 times per week   Current Exercise Activities Include None   Do you need help using the phone?  No   Are you deaf or do you have serious difficulty hearing?  Yes   Do you need help with transportation? No   Do you need help shopping? No   Do you need help preparing meals?  No   Do you need help with housework?  No   Do you need help with laundry? No   Do you need help taking your medications? No   Do you need help managing money? No   Do you ever drive or ride in a car without wearing a seat belt? No   Have you felt unusual stress, anger or loneliness in the last month? No   Who do you live with? Spouse   If you need help, do you have trouble finding someone available to you? No   Have you been bothered in the last four weeks by sexual problems? No   Do you have difficulty concentrating, remembering or making decisions? No         Does the patient have evidence of cognitive impairment? No    Asprin use counseling:Taking ASA appropriately as indicated    Age-appropriate Screening Schedule:  Refer to the list below for future screening recommendations based on patient's age, sex and/or medical conditions. Orders for these recommended tests are listed in the plan section. The patient has been provided with a written plan.    Health Maintenance   Topic Date Due   • LIPID PANEL  11/14/2018   • ZOSTER VACCINE (1 of 2) 11/08/2021 (Originally 6/16/1988)   • TDAP/TD VACCINES (1 - Tdap) 07/13/2025 (Originally 6/16/1959)   • INFLUENZA VACCINE  Completed          The following portions of the patient's history were reviewed and  updated as appropriate: allergies, current medications, past family history, past medical history, past social history, past surgical history and problem list.    Outpatient Medications Prior to Visit   Medication Sig Dispense Refill   • ALPRAZolam (XANAX) 0.5 MG tablet Take 1 tablet by mouth Daily As Needed for Anxiety. 30 tablet 1   • aspirin (ASPIRIN LOW DOSE) 81 MG EC tablet Take 81 mg by mouth Daily.     • atorvastatin (LIPITOR) 40 MG tablet TAKE 1 TABLET BY MOUTH ONCE DAILY 90 tablet 1   • cholecalciferol (VITAMIN D3) 1.25 MG (98714 UT) capsule Take 50,000 Units by mouth Daily.     • lansoprazole (PREVACID) 30 MG capsule Take 1 capsule by mouth once Daily 90 capsule 1   • loratadine (CLARITIN) 10 MG tablet Take 1 tablet by mouth Daily as needed for post nasal drainage, runny nose and sneezing 90 tablet 3   • metoprolol tartrate (LOPRESSOR) 25 MG tablet TAKE 1 TABLET BY MOUTH 2 (TWO) TIMES A  tablet 1   • nortriptyline (PAMELOR) 25 MG capsule Take 25 mg by mouth Every Night.     • OLANZapine-FLUoxetine (SYMBYAX) 12-50 MG per capsule TAKE 1 CAPSULE BY MOUTH ONCE DAILY 90 capsule 1   • Probiotic Product (PROBIOTIC ADVANCED PO) Take  by mouth.     • topiramate (TOPAMAX) 25 MG tablet Take 1 tablet by mouth Daily. 30 tablet 6   • vitamin B-12 (CYANOCOBALAMIN) 1000 MCG tablet Take 1,000 mcg by mouth Daily.     • butalbital-acetaminophen-caffeine (FIORICET, ESGIC) -40 MG per tablet Take  by mouth.     • influenza vac split quad (Flulaval Quadrivalent) 0.5 ML suspension prefilled syringe injection Inject as directed per protocol 0.5 mL 0   • sucralfate (CARAFATE) 1 G tablet Take  by mouth.     • tamsulosin (FLOMAX) 0.4 MG capsule 24 hr capsule Take 1 capsule by mouth once daily 30 capsule 5   • amitriptyline (ELAVIL) 100 MG tablet Take 1 tablet by mouth every night at bedtime. 90 tablet 3     No facility-administered medications prior to visit.        Patient Active Problem List   Diagnosis   • RAMONITA  "(obstructive sleep apnea)   • Migraine without aura and without status migrainosus, not intractable   • Lupus anticoagulant positive   • Bipolar depression (CMS/HCC)   • B12 deficiency   • Lacunar infarction (CMS/HCC)   • BPH with urinary obstruction   • Cerebrovascular disease   • Overweight (BMI 25.0-29.9)   • Anemia   • Anxiety   • Deep venous thrombosis (CMS/HCC)   • Familial combined hyperlipidemia   • Primary insomnia       Advanced Care Planning:  ACP discussion was held with the patient during this visit. Patient does not have an advance directive, declines further assistance.    Review of Systems   Constitutional: Positive for fatigue. Negative for fever.   Gastrointestinal: Negative for abdominal pain.   Psychiatric/Behavioral: Positive for sleep disturbance.   All other systems reviewed and are negative.      Compared to one year ago, the patient feels his physical health is the same.  Compared to one year ago, the patient feels his mental health is the same.    Reviewed chart for potential of high risk medication in the elderly: yes  Reviewed chart for potential of harmful drug interactions in the elderly:yes    Objective         Vitals:    10/30/20 1124   BP: 135/65   Pulse: 61   Resp: 18   Temp: 98 °F (36.7 °C)   TempSrc: Temporal   SpO2: 98%   Weight: 78.6 kg (173 lb 3 oz)   Height: 170.2 cm (67.01\")   PainSc: 0-No pain       Body mass index is 27.12 kg/m².  Discussed the patient's BMI with him. The BMI is above average; BMI management plan is completed.    Physical Exam  Vitals signs and nursing note reviewed.   Constitutional:       General: He is not in acute distress.     Appearance: Normal appearance. He is well-developed and well-groomed. obeseHe is not ill-appearing, toxic-appearing or diaphoretic.      Interventions: Face mask in place.   HENT:      Head: Normocephalic and atraumatic.      Right Ear: Tympanic membrane, ear canal and external ear normal. Decreased hearing noted.      Left Ear: " External ear normal. Decreased hearing noted.      Ears:      Comments: Increased cerumen burden left canal but not impacted nor completely occluded. Cannot see tm fully but visible area within normal limits. Pt removed hearing aids bilaterally for exam  Eyes:      General: Lids are normal. No scleral icterus.        Right eye: No discharge.         Left eye: No discharge.      Extraocular Movements: Extraocular movements intact.      Conjunctiva/sclera: Conjunctivae normal.      Pupils: Pupils are equal, round, and reactive to light.   Neck:      Musculoskeletal: Neck supple.      Thyroid: No thyromegaly.      Trachea: Phonation normal.   Cardiovascular:      Rate and Rhythm: Normal rate and regular rhythm.      Heart sounds: Normal heart sounds.   Pulmonary:      Effort: Pulmonary effort is normal.      Breath sounds: Normal breath sounds.   Abdominal:      General: Bowel sounds are normal. There is no distension.      Palpations: Abdomen is soft. Abdomen is not rigid. There is no mass.      Tenderness: There is no abdominal tenderness. There is no guarding or rebound.   Musculoskeletal:         General: No deformity.   Lymphadenopathy:      Cervical: No cervical adenopathy.   Skin:     General: Skin is warm.      Capillary Refill: Capillary refill takes less than 2 seconds.      Coloration: Skin is not pale.   Neurological:      General: No focal deficit present.      Mental Status: He is alert and oriented to person, place, and time.      Cranial Nerves: No dysarthria.      Motor: No tremor, abnormal muscle tone or seizure activity.      Gait: Gait normal.   Psychiatric:         Attention and Perception: Attention and perception normal.         Mood and Affect: Mood and affect normal.         Speech: Speech normal.         Behavior: Behavior is withdrawn (somewhat). Behavior is cooperative.         Thought Content: Thought content normal.         Cognition and Memory: Cognition and memory normal.          Judgment: Judgment normal.               Assessment/Plan   Medicare Risks and Personalized Health Plan  CMS Preventative Services Quick Reference  Advance Directive Discussion  Cardiovascular risk  Dementia/Memory   Depression/Dysphoria  Fall Risk  Hearing Problem  Immunizations Discussed/Encouraged (specific immunizations; Shingrix )  Polypharmacy    The above risks/problems have been discussed with the patient.  Pertinent information has been shared with the patient in the After Visit Summary.  Follow up plans and orders are seen below in the Assessment/Plan Section.    Diagnoses and all orders for this visit:    1. Medicare annual wellness visit, subsequent (Primary)    2. Bipolar depression (CMS/[facility])    3. Familial combined hyperlipidemia    4. Migraine without aura and without status migrainosus, not intractable    5. At high risk for falls    6. Primary insomnia  -     LORazepam (ATIVAN) 0.5 MG tablet; Take 1 tablet by mouth At Night As Needed for sleep  Dispense: 90 tablet; Refill: 1    discussed safer options than alprazolam for sleep, can try low dose lorazepam. If not effective follow up. [facility], [facility] labs to them as they prescribe the medicines. Shingrix encouraged if he's not had them.     Follow Up:  Return in about 1 year (around 11/1/2021) for Medicare Wellness.     An After Visit Summary and PPPS were given to the patient.

## 2020-11-01 ENCOUNTER — EPISODE CHANGES (OUTPATIENT)
Dept: CASE MANAGEMENT | Facility: OTHER | Age: 80
End: 2020-11-01

## 2020-11-25 ENCOUNTER — PREP FOR SURGERY (OUTPATIENT)
Dept: OTHER | Facility: HOSPITAL | Age: 80
End: 2020-11-25

## 2020-11-25 DIAGNOSIS — Z11.59 SPECIAL SCREENING EXAMINATION FOR UNSPECIFIED VIRAL DISEASE: Primary | ICD-10-CM

## 2020-11-25 DIAGNOSIS — H25.11 NUCLEAR SCLEROTIC CATARACT OF RIGHT EYE: Primary | ICD-10-CM

## 2020-11-25 RX ORDER — PREDNISOLONE ACETATE 10 MG/ML
1 SUSPENSION/ DROPS OPHTHALMIC SEE ADMIN INSTRUCTIONS
Status: CANCELLED | OUTPATIENT
Start: 2020-12-07

## 2020-11-25 RX ORDER — CYCLOPENT/TROPIC/PHEN/KETR/WAT 1%-1%-2.5%
1 DROPS (EA) OPHTHALMIC (EYE)
Status: CANCELLED | OUTPATIENT
Start: 2020-12-07 | End: 2020-12-07

## 2020-11-25 RX ORDER — SODIUM CHLORIDE 0.9 % (FLUSH) 0.9 %
3 SYRINGE (ML) INJECTION EVERY 12 HOURS SCHEDULED
Status: CANCELLED | OUTPATIENT
Start: 2020-12-07

## 2020-11-25 RX ORDER — SODIUM CHLORIDE 0.9 % (FLUSH) 0.9 %
1-10 SYRINGE (ML) INJECTION AS NEEDED
Status: CANCELLED | OUTPATIENT
Start: 2020-12-07

## 2020-11-25 RX ORDER — TETRACAINE HYDROCHLORIDE 5 MG/ML
1 SOLUTION OPHTHALMIC SEE ADMIN INSTRUCTIONS
Status: CANCELLED | OUTPATIENT
Start: 2020-12-07

## 2020-11-30 PROBLEM — H25.11 NUCLEAR SCLEROTIC CATARACT OF RIGHT EYE: Status: ACTIVE | Noted: 2020-11-30

## 2020-12-03 ENCOUNTER — LAB (OUTPATIENT)
Dept: LAB | Facility: HOSPITAL | Age: 80
End: 2020-12-03

## 2020-12-03 DIAGNOSIS — Z11.59 SPECIAL SCREENING EXAMINATION FOR UNSPECIFIED VIRAL DISEASE: ICD-10-CM

## 2020-12-03 PROCEDURE — U0004 COV-19 TEST NON-CDC HGH THRU: HCPCS

## 2020-12-03 PROCEDURE — C9803 HOPD COVID-19 SPEC COLLECT: HCPCS

## 2020-12-04 LAB — SARS-COV-2 RNA RESP QL NAA+PROBE: NOT DETECTED

## 2020-12-04 NOTE — PRE-PROCEDURE INSTRUCTIONS
PAT phone hx reviewed with the pt over the phone.  Pt to have a procedure with Dr. Cortez on 12/7/20.      PAT PASS GIVEN/REVIEWED WITH PT.  VERBALIZED UNDERSTANDING OF THE FOLLOWING:  DO NOT EAT, DRINK, SMOKE, USE SMOKELESS TOBACCO OR CHEW GUM AFTER MIDNIGHT THE NIGHT BEFORE SURGERY.  THIS ALSO INCLUDES HARD CANDIES AND MINTS.    DO NOT SHAVE THE AREA TO BE OPERATED ON AT LEAST 48 HOURS PRIOR TO THE PROCEDURE.  DO NOT WEAR MAKE UP OR NAIL POLISH.  DO NOT LEAVE IN ANY PIERCING OR WEAR JEWELRY THE DAY OF SURGERY.      DO NOT USE ADHESIVES IF YOU WEAR DENTURES.    DO NOT WEAR EYE CONTACTS; BRING IN YOUR GLASSES.    ONLY TAKE MEDICATION THE MORNING OF YOUR PROCEDURE IF INSTRUCTED BY YOUR SURGEON WITH ENOUGH WATER TO SWALLOW THE MEDICATION.  IF YOUR SURGEON DID NOT SPECIFY WHICH MEDICATIONS TO TAKE, YOU WILL NEED TO CALL THEIR OFFICE FOR FURTHER INSTRUCTIONS AND DO AS THEY INSTRUCT.    LEAVE ANYTHING YOU CONSIDER VALUABLE AT HOME.    YOU WILL NEED TO ARRANGE FOR SOMEONE TO DRIVE YOU HOME AFTER SURGERY.  IT IS RECOMMENDED THAT YOU DO NOT DRIVE, WORK, DRINK ALCOHOL OR MAKE MAJOR DECISIONS FOR AT LEAST 24 HOURS AFTER YOUR PROCEDURE IS COMPLETE.      THE DAY OF YOUR PROCEDURE, BRING IN THE FOLLOWING IF APPLICABLE:   PICTURE ID AND INSURANCE/MEDICARE OR MEDICAID CARDS/ANY CO-PAY THAT MAY BE DUE   COPY OF ADVANCED DIRECTIVE/LIVING WILL/POWER OR    CPAP/BIPAP/INHALERS   SKIN PREP SHEET   YOUR PREADMISSION TESTING PASS (IF NOT A PHONE HISTORY)        PAT patient education COVID testing pre-op instructions reviewed with pt.  Verbalized understanding.

## 2020-12-07 ENCOUNTER — HOSPITAL ENCOUNTER (OUTPATIENT)
Facility: HOSPITAL | Age: 80
Setting detail: HOSPITAL OUTPATIENT SURGERY
Discharge: HOME | End: 2020-12-07
Attending: OPHTHALMOLOGY | Admitting: OPHTHALMOLOGY

## 2020-12-07 ENCOUNTER — ANESTHESIA EVENT (OUTPATIENT)
Dept: PERIOP | Facility: HOSPITAL | Age: 80
End: 2020-12-07

## 2020-12-07 ENCOUNTER — ANESTHESIA (OUTPATIENT)
Dept: PERIOP | Facility: HOSPITAL | Age: 80
End: 2020-12-07

## 2020-12-07 VITALS
SYSTOLIC BLOOD PRESSURE: 145 MMHG | BODY MASS INDEX: 26.52 KG/M2 | WEIGHT: 165 LBS | TEMPERATURE: 98 F | DIASTOLIC BLOOD PRESSURE: 64 MMHG | RESPIRATION RATE: 16 BRPM | HEIGHT: 66 IN | OXYGEN SATURATION: 97 % | HEART RATE: 50 BPM

## 2020-12-07 DIAGNOSIS — H25.11 NUCLEAR SCLEROTIC CATARACT OF RIGHT EYE: ICD-10-CM

## 2020-12-07 PROCEDURE — V2632 POST CHMBR INTRAOCULAR LENS: HCPCS | Performed by: OPHTHALMOLOGY

## 2020-12-07 PROCEDURE — 25010000002 PROPOFOL 10 MG/ML EMULSION: Performed by: NURSE ANESTHETIST, CERTIFIED REGISTERED

## 2020-12-07 PROCEDURE — V2787 ASTIGMATISM-CORRECT FUNCTION: HCPCS | Performed by: OPHTHALMOLOGY

## 2020-12-07 DEVICE — IMPLANTABLE DEVICE: Type: IMPLANTABLE DEVICE | Site: POSTERIOR CHAMBER | Status: FUNCTIONAL

## 2020-12-07 RX ORDER — TETRACAINE HYDROCHLORIDE 5 MG/ML
1 SOLUTION OPHTHALMIC SEE ADMIN INSTRUCTIONS
Status: COMPLETED | OUTPATIENT
Start: 2020-12-07 | End: 2020-12-07

## 2020-12-07 RX ORDER — BALANCED SALT SOLUTION 6.4; .75; .48; .3; 3.9; 1.7 MG/ML; MG/ML; MG/ML; MG/ML; MG/ML; MG/ML
SOLUTION OPHTHALMIC AS NEEDED
Status: DISCONTINUED | OUTPATIENT
Start: 2020-12-07 | End: 2020-12-07 | Stop reason: HOSPADM

## 2020-12-07 RX ORDER — KETAMINE HYDROCHLORIDE 50 MG/ML
INJECTION, SOLUTION, CONCENTRATE INTRAMUSCULAR; INTRAVENOUS AS NEEDED
Status: DISCONTINUED | OUTPATIENT
Start: 2020-12-07 | End: 2020-12-07 | Stop reason: SURG

## 2020-12-07 RX ORDER — PREDNISOLONE ACETATE 10 MG/ML
SUSPENSION/ DROPS OPHTHALMIC AS NEEDED
Status: DISCONTINUED | OUTPATIENT
Start: 2020-12-07 | End: 2020-12-07 | Stop reason: HOSPADM

## 2020-12-07 RX ORDER — SODIUM CHLORIDE 0.9 % (FLUSH) 0.9 %
1-10 SYRINGE (ML) INJECTION AS NEEDED
Status: DISCONTINUED | OUTPATIENT
Start: 2020-12-07 | End: 2020-12-07 | Stop reason: HOSPADM

## 2020-12-07 RX ORDER — CYCLOPENT/TROPIC/PHEN/KETR/WAT 1%-1%-2.5%
1 DROPS (EA) OPHTHALMIC (EYE)
Status: COMPLETED | OUTPATIENT
Start: 2020-12-07 | End: 2020-12-07

## 2020-12-07 RX ORDER — TETRACAINE HYDROCHLORIDE 5 MG/ML
SOLUTION OPHTHALMIC AS NEEDED
Status: DISCONTINUED | OUTPATIENT
Start: 2020-12-07 | End: 2020-12-07 | Stop reason: HOSPADM

## 2020-12-07 RX ORDER — ACETAZOLAMIDE 500 MG/1
500 CAPSULE, EXTENDED RELEASE ORAL ONCE
Status: COMPLETED | OUTPATIENT
Start: 2020-12-07 | End: 2020-12-07

## 2020-12-07 RX ORDER — PREDNISOLONE ACETATE 10 MG/ML
1 SUSPENSION/ DROPS OPHTHALMIC SEE ADMIN INSTRUCTIONS
Status: DISCONTINUED | OUTPATIENT
Start: 2020-12-07 | End: 2020-12-07 | Stop reason: HOSPADM

## 2020-12-07 RX ORDER — SODIUM CHLORIDE, SODIUM LACTATE, POTASSIUM CHLORIDE, CALCIUM CHLORIDE 600; 310; 30; 20 MG/100ML; MG/100ML; MG/100ML; MG/100ML
1000 INJECTION, SOLUTION INTRAVENOUS CONTINUOUS
Status: DISCONTINUED | OUTPATIENT
Start: 2020-12-07 | End: 2020-12-07 | Stop reason: HOSPADM

## 2020-12-07 RX ORDER — SODIUM CHLORIDE 0.9 % (FLUSH) 0.9 %
3 SYRINGE (ML) INJECTION EVERY 12 HOURS SCHEDULED
Status: DISCONTINUED | OUTPATIENT
Start: 2020-12-07 | End: 2020-12-07 | Stop reason: HOSPADM

## 2020-12-07 RX ORDER — PROPOFOL 10 MG/ML
VIAL (ML) INTRAVENOUS AS NEEDED
Status: DISCONTINUED | OUTPATIENT
Start: 2020-12-07 | End: 2020-12-07 | Stop reason: SURG

## 2020-12-07 RX ORDER — LIDOCAINE HYDROCHLORIDE 40 MG/ML
INJECTION, SOLUTION RETROBULBAR; TOPICAL AS NEEDED
Status: DISCONTINUED | OUTPATIENT
Start: 2020-12-07 | End: 2020-12-07 | Stop reason: HOSPADM

## 2020-12-07 RX ADMIN — ACETAZOLAMIDE 500 MG: 500 CAPSULE, EXTENDED RELEASE ORAL at 11:57

## 2020-12-07 RX ADMIN — Medication 1 DROP: at 10:30

## 2020-12-07 RX ADMIN — Medication 1 DROP: at 10:25

## 2020-12-07 RX ADMIN — SODIUM CHLORIDE, POTASSIUM CHLORIDE, SODIUM LACTATE AND CALCIUM CHLORIDE 1000 ML: 600; 310; 30; 20 INJECTION, SOLUTION INTRAVENOUS at 10:25

## 2020-12-07 RX ADMIN — PROPOFOL 50 MG: 10 INJECTION, EMULSION INTRAVENOUS at 11:30

## 2020-12-07 RX ADMIN — TETRACAINE HYDROCHLORIDE 1 DROP: 5 SOLUTION OPHTHALMIC at 10:19

## 2020-12-07 RX ADMIN — Medication 1 DROP: at 10:20

## 2020-12-07 RX ADMIN — TETRACAINE HYDROCHLORIDE 1 DROP: 5 SOLUTION OPHTHALMIC at 10:18

## 2020-12-07 RX ADMIN — KETAMINE HYDROCHLORIDE 15 MG: 50 INJECTION, SOLUTION INTRAMUSCULAR; INTRAVENOUS at 11:30

## 2020-12-07 NOTE — ANESTHESIA POSTPROCEDURE EVALUATION
Patient: Sharifa Mercer    Procedure Summary     Date: 12/07/20 Room / Location: [facility] OR 1 / [facility] OR    Anesthesia Start: 1128 Anesthesia Stop:     Procedure: CATARACT PHACO EXTRACTION WITH INTRAOCULAR LENS IMPLANT RIGHT WITH TORIC LENS (Right Eye) Diagnosis:       Nuclear sclerotic cataract of right eye      (Nuclear sclerotic cataract of right eye [H25.11])    Surgeon: Carmelo Cortez MD Provider: Jignesh Calderon CRNA    Anesthesia Type: MAC ASA Status: 3          Anesthesia Type: MAC    Vitals  HR 56  Sat 99  /64  Resp 12  Temp 98        Post Anesthesia Care and Evaluation    Patient location during evaluation: bedside  Patient participation: complete - patient participated  Level of consciousness: awake and alert and sleepy but conscious  Pain score: 0  Pain management: adequate  Airway patency: patent  Anesthetic complications: No anesthetic complications  PONV Status: none  Cardiovascular status: acceptable  Respiratory status: acceptable and nasal cannula  Hydration status: acceptable

## 2020-12-07 NOTE — OP NOTE
OPERATIVE NOTE    Date of Procedure: 12/7/2020  Patient Name: Sharifa Mercer  Patient MRN: [id number]  YOB: 1940     Preoperative Diagnosis: Right nuclear sclerotic cataract.     Postoperative Diagnosis: Right nuclear sclerotic cataract.     Procedure Performed: Phacoemulsification with implantation of a  foldable posterior chamber intraocular lens, Right eye.     Surgeon: Carmelo Cortez MD     Anesthesia:  Monitored Anesthesia Care (MAC)      Brief History and Indication: The patient presents with a history of past progressive loss of vision.  Patient was diagnosed with cataract and requests removal for increased ability to read and see.     Operation Description: The patient was taken to the OR and prepped and draped in the usual sterile ophthalmic fashion. A lid speculum was placed in the eye.  A #75 blade was then used to make a stab incision two o’clock hours from the intended temporal clear cornea groove. The anterior chamber was then inflated with a Viscoelastic. A metal microkeratome blade was then used to enter the anterior chamber at the temporal clear cornea site. A three level tunnel incision was made. A curvilinear capsulorrhexis was then performed with a bent cystotome needle and capsulorrhexis forceps.  BSS on a 30 gauge bent cannula was used to hydro-dissect, and hydro-delineate the lens. Good fluid waves and hydro-delineation were noted. Phacoemulsification was then used to remove nuclear material without complications. The residual cortical and lenticular material was then removed with irrigation and aspiration. Viscoelastics were then used to inflate the bag in a soft shell technique.  A Toric PCIOL was placed on the appropriate axis.  Post-implantation, there were no rents or tears in the bag and the lens was noted to be stable and centered. The residual Viscoelastic was then removed with irrigation and aspiration.  The wound was checked and found to be without leaks.  Therefore a Polydex ointment and one drop of a Prednisilone eye drop was placed in the eye.     Implant Information:   Implant Name Type Inv. Item Serial No.  Lot No. LRB No. Used Action   LENS ACRYSOF TORIC [id number] - [id number] - [id number] Implant LENS ACRYSOF TORIC [id number] JASON NA Right 1 Implanted       Complications: None    []   Changed to complex procedure due to: []  hypermature, white cataract. Blue dye was used. []   small iris. A Malyugin Ring was used.    Discharge and Condition  The patient was transported to same day surgery in excellent condition and scheduled for follow-up tomorrow morning. The patient was given instructions on use of eye drops for the operative eye and was specifically instructed to call Dr. Cortez at his office or home for any nausea, vomiting, headache, decreased visual acuity, or pain, or if the patient had any general concerns.    Carmelo Cortez MD  12/7/2020

## 2020-12-07 NOTE — DISCHARGE INSTRUCTIONS
Please follow all post op instructions and follow up appointment time from your physician's office included in your discharge packet.  .   No pushing, pulling, tugging,  heavy lifting, or strenuous activity.  No major decision making, driving, or drinking alcoholic beverages for 24 hours. ( due to the medications you have  received)  Always use good hand hygiene/washing techniques.  NO driving while taking pain medications.    * if you have an incision:  Check your incision area every day for signs of infection.   Check for:  * more redness, swelling, or pain  *more fluid or blood  *warmth  *pus or bad smell  To assist you in voiding:  Drink plenty of fluids  Listen to running water while attempting to void.    If you are unable to urinate and you have an uncomfortable urge to void or it has been   6 hours since you were discharged, return to the Emergency Room            Post Operative Cataract Instructions     Right Eye  POST OPERATIVE INSTRUCTIONS  • You have received anesthesia today. DO NOT drive, drink alcohol, sign legal documents.   • After surgery, your eye will not hurt. It may feel scratchy, sticky or uncomfortable. Your eye will be sensitive to light.  • Most people see better 1-3 days after the procedure, but it could take 3 weeks to get the full benefits and reach your visual potential. If your vision is blurry for a few days it is normal, and means you may have swelling outside or inside the eye. For some patients, a bubble is placed and there will be blurriness.   • You should receive a post-op kit with tape and an eye shield. Wear the shield for the first 3 nights after surgery to keep you from rubbing the eye.  • You may wear glasses or sunglasses during the day.  You must keep eye protected at all times.  • Most people are able to return to their normal routine 1-3 days after surgery, however, due to the brain adjusting to your new vision you may have trouble judging distances and want to be  careful when driving and going up and downstairs.   • You can shower and wash your hair the day after surgery. Keep water, shampoo, hair spray and shaving lotion out of the eye, especially for the first week.   • If eyes become stuck together after surgery you may soak with warm cloth.  • During the first week, you should AVOID:   1. Rubbing or putting pressure on your eye.  2. Eye make-up, face cream or lotion, hair coloring or perms  3. Strenuous activities, such as running or lifting weights, as to avoid sweat from getting in the eye. Avoid swimming, hot tubs, fumes or ayan conditions.   4. Sleeping on operative side.  5. Excessive sneezing or coughing.  6. Hanging the head down for periods of time. Keep your head above your heart.    Some discomfort and blurred vision after surgery are normal, but if you have any unusual pain, swelling, bleeding or sudden decrease in vision, contact our office immediately.     Emergency assistance is available at any time by calling:    Dr.Mark Diego Cortez  [phone]    If unable to reach call [facility] @ [phone]    You have been prescribed an eye drop to use after surgery, please follow these instructions and bring eye drops and instructions with you to post op appointment:    Prednisolone Acetate: Shake well before use    USE 1 DROP 4 (FOUR) TIMES A DAY FOR 1 WEEK THEN STARTING WEEK 2, ONLY USE 2 (TWO) TIMES A DAY UNTIL YOU RUN OUT OF DROPS.     PLACE A VIOLETA IN THE DAY COLUMN EACH TIME YOU USE TO KEEP ON SCHEDULE    WEEK 1-USE 4  (FOUR) TIMES A DAY DAY 1  []   []    []   []     DAY 2  []   []    []   []  DAY 3  []   []    []   []  DAY 4  []   []    []   []  DAY 5  []   []    []   []  DAY 6  []   []    []   []  DAY 7  []   []    []   []      WEEK 2-USE 2 (TWO)  TIMES A DAY DAY 1  []   []  DAY 2  []   []  DAY 3  []   []  DAY 4  []   []  DAY 5  []   []  DAY 6  []   []  DAY 7  []   []       WEEK 3-USE 2 (TWO) TIMES A DAY DAY 1  []   []  DAY 2  []   []  DAY 3  []   []  DAY 4  []   []  DAY 5  []   []  DAY 6  []   []  DAY 7  []   []      WEEK 4-USE 2 (TWO)TIMES A DAY     DAY 1  []   []  DAY 2  []   []  DAY 3  []   []  DAY 4  []   []  DAY 5  []   []  DAY 6  []   []  DAY 7  []   []

## 2020-12-07 NOTE — H&P
[facility]         History and Physical    Patient Name: Sharifa Mercer  MRN: [id number]  : 1940  Gender: male     HPI: Patient complaint of PPLOV Right eye diagnosed with cataract. Patient requests PHACO PCIOL for Increase of VA/ADL.    History:    Past Medical History:   Diagnosis Date   • Adhesive capsulitis of left shoulder     Dr. Barrera   • B12 deficiency    • Bipolar depression (CMS/[facility])    • BPH with urinary obstruction    • Cataract, bilateral    • Cerebrovascular disease    • Elevated cholesterol    • Gallstones    • GERD (gastroesophageal reflux disease)    • GI bleeding    • History of nuclear stress test     unsure of date   • [place] (hard of hearing)     bilateral hearing aids   • Hypertension    • Iron deficiency anemia     likely secondary to Xarelto use.   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   • Saphenous vein clot    • Wears glasses        Past Surgical History:   Procedure Laterality Date   • APPENDECTOMY     • CAPSULE ENDOSCOPY     • CARDIAC CATHETERIZATION      no intervention   • CHOLECYSTECTOMY     • COLONOSCOPY     • ENDOSCOPY     • WISDOM TOOTH EXTRACTION         Social History     Socioeconomic History   • Marital status:      Spouse name: Not on file   • Number of children: Not on file   • Years of education: Not on file   • Highest education level: Not on file   Tobacco Use   • Smoking status: Former Smoker     Packs/day: 1.00     Years: 10.00     Pack years: 10.00     Types: Cigarettes     Quit date:      Years since quittin.9   • Smokeless tobacco: Never Used   Substance and Sexual Activity   • Alcohol use: Yes     Comment: maybe a drink of liquor every 2 months   • Drug use: No   • Sexual activity: Defer   Social History Narrative      to Janessa. Retired from [employer].       Family History   Problem Relation Age of Onset   • Cancer Mother    • Diabetes Mother    • Stroke Mother    • Heart disease Mother    • Migraines Sister    • Breast cancer Sister        Prior to Admission Medications:  Medications Prior to Admission   Medication Sig Dispense Refill Last Dose   • aspirin (ASPIRIN LOW DOSE) 81 MG EC tablet Take 81 mg by mouth Daily.   12/6/2020 at 0800   • atorvastatin (LIPITOR) 40 MG tablet TAKE 1 TABLET BY MOUTH ONCE DAILY 90 tablet 1 12/6/2020 at 0800   • butalbital-acetaminophen-caffeine (FIORICET, ESGIC) -40 MG per tablet Take 1 tablet by mouth Every 4 (Four) Hours As Needed.   12/6/2020 at 0800   • Cholecalciferol 125 MCG (5000 UT) tablet Take 5,000 Units by mouth Daily.   12/6/2020 at 0800   • lansoprazole (PREVACID) 30 MG capsule Take 1 capsule by mouth once Daily (Patient taking differently: Take 30 mg by mouth Daily.) 90 capsule 1 12/6/2020 at 0800   • loratadine (CLARITIN) 10 MG tablet Take 1 tablet by mouth Daily as needed for post nasal drainage, runny nose and sneezing 90 tablet 3 12/6/2020 at 0800   • LORazepam (ATIVAN) 0.5 MG tablet Take 1 tablet by mouth At Night As Needed for sleep 90 tablet 1 12/6/2020 at 2100   • metoprolol tartrate (LOPRESSOR) 25 MG tablet TAKE 1 TABLET BY MOUTH 2 (TWO) TIMES A  tablet 1 12/6/2020 at 2100   • nortriptyline (PAMELOR) 25 MG capsule Take 25 mg by mouth Every Night.   12/6/2020 at 2100   • OLANZapine-FLUoxetine (SYMBYAX) 12-50 MG per capsule TAKE 1 CAPSULE BY MOUTH ONCE DAILY (Patient taking differently: Take 1 capsule by mouth Every Evening.) 90 capsule 1 12/6/2020 at 2100   • topiramate (TOPAMAX) 25 MG tablet Take 1 tablet by mouth Daily. 30 tablet 6 12/6/2020 at 2100   • vitamin B-12 (CYANOCOBALAMIN) 1000 MCG tablet Take 1,000 mcg by mouth Daily.   12/6/2020 at 0800   • ALPRAZolam (XANAX) 0.5 MG tablet Take 1 tablet by mouth Daily As Needed for Anxiety. 30 tablet 1  Unknown   • influenza vac split quad (Flulaval Quadrivalent) 0.5 ML suspension prefilled syringe injection Inject as directed per protocol 0.5 mL 0 More than a month at Unknown time   • Probiotic Product (PROBIOTIC ADVANCED PO) Take 1 capsule by mouth Daily.      • sucralfate (CARAFATE) 1 G tablet Take  by mouth.      • tamsulosin (FLOMAX) 0.4 MG capsule 24 hr capsule Take 1 capsule by mouth once daily (Patient not taking: Reported on 12/4/2020) 30 capsule 5 Not Taking at Unknown time       Allergies:  No Known Allergies     Vitals: Temp:  [97.5 °F (36.4 °C)] 97.5 °F (36.4 °C)  Heart Rate:  [54] 54  Resp:  [18] 18  BP: (156)/(65) 156/65    Review of Systems:   Within Normal Limits Abnormal   HEENT [x]    []     Cardiovascular [x]   []     Gastrointestinal [x]   []     Genitourinary [x]   []     Neurologic [x]   []     Pulmonary [x]   []       Physical Exam:   Within Normal Limits Abnormal   HEENT [x]    []     Heart [x]   []     Lungs [x]   []     Abdomen [x]   []     Extremities [x]   []       Impression: Right nuclear sclerotic cataract.     Plan: CATARACT PHACO EXTRACTION WITH INTRAOCULAR LENS IMPLANT RIGHT WITH TORIC LENS (Right)     Carmelo Cortez MD  12/7/2020

## 2020-12-07 NOTE — ANESTHESIA PREPROCEDURE EVALUATION
Anesthesia Evaluation     Patient summary reviewed and Nursing notes reviewed   no history of anesthetic complications:  NPO Solid Status: > 8 hours  NPO Liquid Status: > 8 hours           Airway   Mallampati: I  TM distance: >3 FB  Neck ROM: full  no difficulty expected  Dental - normal exam     Pulmonary - normal exam   (+) a smoker Former, sleep apnea,   Cardiovascular - normal exam    PT is on anticoagulation therapy  Patient on routine beta blocker  Rhythm: regular  Rate: normal    (+) hypertension, DVT, hyperlipidemia,       Neuro/Psych  (+) CVA, headaches, psychiatric history Anxiety, Depression and Bipolar,     GI/Hepatic/Renal/Endo    (+)  GERD,      Musculoskeletal     Abdominal  - normal exam    Abdomen: soft.  Bowel sounds: normal.   Substance History   (+) alcohol use,      OB/GYN          Other   arthritis, autoimmune disease lupus,                      Anesthesia Plan    ASA 3     MAC   (Risks and benefits discussed including risk of aspiration, recall and dental damage. All patient questions answered. Will continue with POC.)  intravenous induction     Anesthetic plan, all risks, benefits, and alternatives have been provided, discussed and informed consent has been obtained with: patient.

## 2020-12-11 ENCOUNTER — PREP FOR SURGERY (OUTPATIENT)
Dept: OTHER | Facility: HOSPITAL | Age: 80
End: 2020-12-11

## 2020-12-11 DIAGNOSIS — Z11.59 SPECIAL SCREENING EXAMINATION FOR UNSPECIFIED VIRAL DISEASE: Primary | ICD-10-CM

## 2020-12-11 DIAGNOSIS — H25.12 NUCLEAR SCLEROTIC CATARACT OF LEFT EYE: Primary | ICD-10-CM

## 2020-12-11 RX ORDER — PREDNISOLONE ACETATE 10 MG/ML
1 SUSPENSION/ DROPS OPHTHALMIC SEE ADMIN INSTRUCTIONS
Status: CANCELLED | OUTPATIENT
Start: 2020-12-21

## 2020-12-11 RX ORDER — TETRACAINE HYDROCHLORIDE 5 MG/ML
1 SOLUTION OPHTHALMIC SEE ADMIN INSTRUCTIONS
Status: CANCELLED | OUTPATIENT
Start: 2020-12-21

## 2020-12-11 RX ORDER — SODIUM CHLORIDE 0.9 % (FLUSH) 0.9 %
3 SYRINGE (ML) INJECTION EVERY 12 HOURS SCHEDULED
Status: CANCELLED | OUTPATIENT
Start: 2020-12-21

## 2020-12-11 RX ORDER — CYCLOPENT/TROPIC/PHEN/KETR/WAT 1%-1%-2.5%
1 DROPS (EA) OPHTHALMIC (EYE)
Status: CANCELLED | OUTPATIENT
Start: 2020-12-21 | End: 2020-12-21

## 2020-12-11 RX ORDER — SODIUM CHLORIDE 0.9 % (FLUSH) 0.9 %
1-10 SYRINGE (ML) INJECTION AS NEEDED
Status: CANCELLED | OUTPATIENT
Start: 2020-12-21

## 2020-12-14 PROBLEM — H25.12 NUCLEAR SCLEROTIC CATARACT OF LEFT EYE: Status: ACTIVE | Noted: 2020-12-14

## 2020-12-17 ENCOUNTER — LAB (OUTPATIENT)
Dept: LAB | Facility: HOSPITAL | Age: 80
End: 2020-12-17

## 2020-12-17 DIAGNOSIS — Z11.59 SPECIAL SCREENING EXAMINATION FOR UNSPECIFIED VIRAL DISEASE: ICD-10-CM

## 2020-12-17 PROCEDURE — C9803 HOPD COVID-19 SPEC COLLECT: HCPCS

## 2020-12-17 PROCEDURE — U0004 COV-19 TEST NON-CDC HGH THRU: HCPCS

## 2020-12-18 LAB — SARS-COV-2 RNA RESP QL NAA+PROBE: NOT DETECTED

## 2020-12-21 ENCOUNTER — ANESTHESIA (OUTPATIENT)
Dept: PERIOP | Facility: HOSPITAL | Age: 80
End: 2020-12-21

## 2020-12-21 ENCOUNTER — ANESTHESIA EVENT (OUTPATIENT)
Dept: PERIOP | Facility: HOSPITAL | Age: 80
End: 2020-12-21

## 2020-12-21 ENCOUNTER — HOSPITAL ENCOUNTER (OUTPATIENT)
Facility: HOSPITAL | Age: 80
Setting detail: HOSPITAL OUTPATIENT SURGERY
Discharge: HOME | End: 2020-12-21
Attending: OPHTHALMOLOGY | Admitting: OPHTHALMOLOGY

## 2020-12-21 VITALS
WEIGHT: 165 LBS | RESPIRATION RATE: 16 BRPM | TEMPERATURE: 98.4 F | HEIGHT: 66 IN | BODY MASS INDEX: 26.52 KG/M2 | DIASTOLIC BLOOD PRESSURE: 70 MMHG | SYSTOLIC BLOOD PRESSURE: 152 MMHG | HEART RATE: 54 BPM | OXYGEN SATURATION: 99 %

## 2020-12-21 DIAGNOSIS — H25.12 NUCLEAR SCLEROTIC CATARACT OF LEFT EYE: ICD-10-CM

## 2020-12-21 PROCEDURE — 25010000002 PROPOFOL 10 MG/ML EMULSION: Performed by: NURSE ANESTHETIST, CERTIFIED REGISTERED

## 2020-12-21 PROCEDURE — V2787 ASTIGMATISM-CORRECT FUNCTION: HCPCS | Performed by: OPHTHALMOLOGY

## 2020-12-21 PROCEDURE — V2632 POST CHMBR INTRAOCULAR LENS: HCPCS | Performed by: OPHTHALMOLOGY

## 2020-12-21 DEVICE — IMPLANTABLE DEVICE: Type: IMPLANTABLE DEVICE | Site: POSTERIOR CHAMBER | Status: FUNCTIONAL

## 2020-12-21 RX ORDER — BALANCED SALT SOLUTION 6.4; .75; .48; .3; 3.9; 1.7 MG/ML; MG/ML; MG/ML; MG/ML; MG/ML; MG/ML
SOLUTION OPHTHALMIC AS NEEDED
Status: DISCONTINUED | OUTPATIENT
Start: 2020-12-21 | End: 2020-12-21 | Stop reason: HOSPADM

## 2020-12-21 RX ORDER — PREDNISOLONE ACETATE 10 MG/ML
SUSPENSION/ DROPS OPHTHALMIC AS NEEDED
Status: DISCONTINUED | OUTPATIENT
Start: 2020-12-21 | End: 2020-12-21 | Stop reason: HOSPADM

## 2020-12-21 RX ORDER — SODIUM CHLORIDE 0.9 % (FLUSH) 0.9 %
1-10 SYRINGE (ML) INJECTION AS NEEDED
Status: DISCONTINUED | OUTPATIENT
Start: 2020-12-21 | End: 2020-12-21 | Stop reason: HOSPADM

## 2020-12-21 RX ORDER — PREDNISOLONE ACETATE 10 MG/ML
SUSPENSION/ DROPS OPHTHALMIC
Qty: 2 ML | Refills: 0
Start: 2020-12-21 | End: 2022-11-03

## 2020-12-21 RX ORDER — SODIUM CHLORIDE, SODIUM LACTATE, POTASSIUM CHLORIDE, CALCIUM CHLORIDE 600; 310; 30; 20 MG/100ML; MG/100ML; MG/100ML; MG/100ML
1000 INJECTION, SOLUTION INTRAVENOUS CONTINUOUS
Status: DISCONTINUED | OUTPATIENT
Start: 2020-12-21 | End: 2020-12-21 | Stop reason: HOSPADM

## 2020-12-21 RX ORDER — PREDNISOLONE ACETATE 10 MG/ML
1 SUSPENSION/ DROPS OPHTHALMIC SEE ADMIN INSTRUCTIONS
Status: DISCONTINUED | OUTPATIENT
Start: 2020-12-21 | End: 2020-12-21 | Stop reason: HOSPADM

## 2020-12-21 RX ORDER — CYCLOPENT/TROPIC/PHEN/KETR/WAT 1%-1%-2.5%
1 DROPS (EA) OPHTHALMIC (EYE)
Status: COMPLETED | OUTPATIENT
Start: 2020-12-21 | End: 2020-12-21

## 2020-12-21 RX ORDER — ACETAZOLAMIDE 500 MG/1
500 CAPSULE, EXTENDED RELEASE ORAL ONCE
Status: COMPLETED | OUTPATIENT
Start: 2020-12-21 | End: 2020-12-21

## 2020-12-21 RX ORDER — LIDOCAINE HYDROCHLORIDE 20 MG/ML
INJECTION, SOLUTION INTRAVENOUS AS NEEDED
Status: DISCONTINUED | OUTPATIENT
Start: 2020-12-21 | End: 2020-12-21 | Stop reason: SURG

## 2020-12-21 RX ORDER — TETRACAINE HYDROCHLORIDE 5 MG/ML
1 SOLUTION OPHTHALMIC SEE ADMIN INSTRUCTIONS
Status: COMPLETED | OUTPATIENT
Start: 2020-12-21 | End: 2020-12-21

## 2020-12-21 RX ORDER — TETRACAINE HYDROCHLORIDE 5 MG/ML
SOLUTION OPHTHALMIC AS NEEDED
Status: DISCONTINUED | OUTPATIENT
Start: 2020-12-21 | End: 2020-12-21 | Stop reason: HOSPADM

## 2020-12-21 RX ORDER — SODIUM CHLORIDE 0.9 % (FLUSH) 0.9 %
3 SYRINGE (ML) INJECTION EVERY 12 HOURS SCHEDULED
Status: DISCONTINUED | OUTPATIENT
Start: 2020-12-21 | End: 2020-12-21 | Stop reason: HOSPADM

## 2020-12-21 RX ORDER — LIDOCAINE HYDROCHLORIDE 40 MG/ML
INJECTION, SOLUTION RETROBULBAR; TOPICAL AS NEEDED
Status: DISCONTINUED | OUTPATIENT
Start: 2020-12-21 | End: 2020-12-21 | Stop reason: HOSPADM

## 2020-12-21 RX ADMIN — ACETAZOLAMIDE 500 MG: 500 CAPSULE, EXTENDED RELEASE ORAL at 13:42

## 2020-12-21 RX ADMIN — SODIUM CHLORIDE, POTASSIUM CHLORIDE, SODIUM LACTATE AND CALCIUM CHLORIDE 1000 ML: 600; 310; 30; 20 INJECTION, SOLUTION INTRAVENOUS at 12:38

## 2020-12-21 RX ADMIN — Medication 1 DROP: at 12:41

## 2020-12-21 RX ADMIN — PROPOFOL 120 MCG/KG/MIN: 10 INJECTION, EMULSION INTRAVENOUS at 13:15

## 2020-12-21 RX ADMIN — TETRACAINE HYDROCHLORIDE 1 DROP: 5 SOLUTION OPHTHALMIC at 12:35

## 2020-12-21 RX ADMIN — LIDOCAINE HYDROCHLORIDE 60 MG: 20 INJECTION, SOLUTION INTRAVENOUS at 13:15

## 2020-12-21 RX ADMIN — Medication 1 DROP: at 12:46

## 2020-12-21 RX ADMIN — Medication 1 DROP: at 12:36

## 2020-12-21 RX ADMIN — TETRACAINE HYDROCHLORIDE 1 DROP: 5 SOLUTION OPHTHALMIC at 12:34

## 2020-12-21 NOTE — ANESTHESIA POSTPROCEDURE EVALUATION
Patient: Sharifa Mercer    Procedure Summary     Date: 12/21/20 Room / Location: [facility] OR 1 /  [facility] OR    Anesthesia Start: 1307 Anesthesia Stop: 1333    Procedure: CATARACT PHACO EXTRACTION WITH INTRAOCULAR LENS IMPLANT LEFT WITH TORIC LENS (Left Eye) Diagnosis:       Nuclear sclerotic cataract of left eye      (Nuclear sclerotic cataract of left eye [H25.12])    Surgeon: Carmelo Cortez MD Provider: Gomez Franco CRNA    Anesthesia Type: MAC ASA Status: 3          Anesthesia Type: MAC    Vitals  No vitals data found for the desired time range.          Post Anesthesia Care and Evaluation    Patient location during evaluation: bedside  Patient participation: complete - patient participated  Level of consciousness: awake and alert  Pain score: 0  Pain management: adequate  Airway patency: patent  Anesthetic complications: No anesthetic complications  PONV Status: none  Cardiovascular status: acceptable  Respiratory status: acceptable  Hydration status: acceptable

## 2020-12-21 NOTE — OP NOTE
OPERATIVE NOTE    Date of Procedure: 12/21/2020  Patient Name: Sharifa Mercer  Patient MRN: [id number]  YOB: 1940     Preoperative Diagnosis: Left nuclear sclerotic cataract.     Postoperative Diagnosis: Left nuclear sclerotic cataract.     Procedure Performed: Phacoemulsification with implantation of a  foldable posterior chamber intraocular lens, Left eye.     Surgeon: Carmelo Cortez MD     Anesthesia:  Monitored Anesthesia Care (MAC)      Brief History and Indication: The patient presents with a history of past progressive loss of vision.  Patient was diagnosed with cataract and requests removal for increased ability to read and see.     Operation Description: The patient was taken to the OR and prepped and draped in the usual sterile ophthalmic fashion. A lid speculum was placed in the eye.  A #75 blade was then used to make a stab incision two o’clock hours from the intended temporal clear cornea groove. The anterior chamber was then inflated with a Viscoelastic. A metal microkeratome blade was then used to enter the anterior chamber at the temporal clear cornea site. A three level tunnel incision was made. A curvilinear capsulorrhexis was then performed with a bent cystotome needle and capsulorrhexis forceps.  BSS on a 30 gauge bent cannula was used to hydro-dissect, and hydro-delineate the lens. Good fluid waves and hydro-delineation were noted. Phacoemulsification was then used to remove nuclear material without complications. The residual cortical and lenticular material was then removed with irrigation and aspiration. Viscoelastics were then used to inflate the bag in a soft shell technique.  A Toric PCIOL was placed on the appropriate axis.  Post-implantation, there were no rents or tears in the bag and the lens was noted to be stable and centered. The residual Viscoelastic was then removed with irrigation and aspiration.  The wound was checked and found to be without leaks. Therefore  a Polydex ointment and one drop of a Prednisilone eye drop was placed in the eye.     Implant Information:   Implant Name Type Inv. Item Serial No.  Lot No. LRB No. Used Action   LENS ACRYSOF TORIC [id number] - [id number] - [id number] Implant LENS ACRYSOF TORIC [id number] JASON  Left 1 Implanted       Complications: None    []   Changed to complex procedure due to: []  hypermature, white cataract. Blue dye was used. []   small iris. A Malyugin Ring was used.    Discharge and Condition  The patient was transported to same day surgery in excellent condition and scheduled for follow-up tomorrow morning. The patient was given instructions on use of eye drops for the operative eye and was specifically instructed to call Dr. Cortez at his office or home for any nausea, vomiting, headache, decreased visual acuity, or pain, or if the patient had any general concerns.    Carmelo Cortez MD  12/21/2020

## 2020-12-21 NOTE — H&P
[facility]         History and Physical    Patient Name: Sharifa Mercer  MRN: [id number]  : 1940  Gender: male     HPI: Patient complaint of PPLOV Left eye diagnosed with cataract. Patient requests PHACO PCIOL for Increase of VA/ADL.    History:    Past Medical History:   Diagnosis Date   • Adhesive capsulitis of left shoulder     Dr. Barrera   • B12 deficiency    • Bipolar depression (CMS/[facility])    • BPH with urinary obstruction    • Cataract, bilateral     s/p surgery on the right   • Cerebrovascular disease    • Elevated cholesterol    • Gallstones    • GERD (gastroesophageal reflux disease)    • GI bleeding    • History of nuclear stress test     unsure of date   • [place] (hard of hearing)     bilateral hearing aids   • Hypertension    • Iron deficiency anemia     likely secondary to Xarelto use.   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   • Saphenous vein clot    • Wears glasses        Past Surgical History:   Procedure Laterality Date   • APPENDECTOMY     • CAPSULE ENDOSCOPY     • CARDIAC CATHETERIZATION      no intervention   • CATARACT EXTRACTION W/ INTRAOCULAR LENS IMPLANT Right 2020    Procedure: CATARACT PHACO EXTRACTION WITH INTRAOCULAR LENS IMPLANT RIGHT WITH TORIC LENS;  Surgeon: Carmelo Cortez MD;  Location: [facility];  Service: Ophthalmology;  Laterality: Right;   • CHOLECYSTECTOMY     • COLONOSCOPY     • ENDOSCOPY     • WISDOM TOOTH EXTRACTION         Social History     Socioeconomic History   • Marital status:      Spouse name: Not on file   • Number of children: Not on file   • Years of education: Not on file   • Highest education level: Not on file   Tobacco Use   • Smoking status: Former Smoker     Packs/day: 1.00     Years: 10.00     Pack years: 10.00      Types: Cigarettes     Quit date: 1964     Years since quittin.0   • Smokeless tobacco: Never Used   Substance and Sexual Activity   • Alcohol use: Yes     Comment: maybe a drink of liquor every 2 months   • Drug use: No   • Sexual activity: Defer   Social History Narrative     to Janessa. Retired from [employer].       Family History   Problem Relation Age of Onset   • Cancer Mother    • Diabetes Mother    • Stroke Mother    • Heart disease Mother    • Migraines Sister    • Breast cancer Sister        Prior to Admission Medications:  Medications Prior to Admission   Medication Sig Dispense Refill Last Dose   • ALPRAZolam (XANAX) 0.5 MG tablet Take 1 tablet by mouth Daily As Needed for Anxiety. 30 tablet 1 2020 at 2200   • aspirin (ASPIRIN LOW DOSE) 81 MG EC tablet Take 81 mg by mouth Daily.   2020 at 1000   • atorvastatin (LIPITOR) 40 MG tablet TAKE 1 TABLET BY MOUTH ONCE DAILY 90 tablet 1 2020 at 1600   • Cholecalciferol 125 MCG (5000 UT) tablet Take 5,000 Units by mouth Daily.   2020 at 1600   • influenza vac split quad (Flulaval Quadrivalent) 0.5 ML suspension prefilled syringe injection Inject as directed per protocol 0.5 mL 0 2020 at 30758   • lansoprazole (PREVACID) 30 MG capsule Take 1 capsule by mouth once Daily (Patient taking differently: Take 30 mg by mouth Daily.) 90 capsule 1 2020 at 1600   • loratadine (CLARITIN) 10 MG tablet Take 1 tablet by mouth Daily as needed for post nasal drainage, runny nose and sneezing 90 tablet 3 2020 at 1600   • LORazepam (ATIVAN) 0.5 MG tablet Take 1 tablet by mouth At Night As Needed for sleep 90 tablet 1 2020 at 1600   • metoprolol tartrate (LOPRESSOR) 25 MG tablet TAKE 1 TABLET BY MOUTH 2 (TWO) TIMES A  tablet 1 2020 at 1600   • nortriptyline (PAMELOR) 25 MG capsule Take 25 mg by mouth Every Night.   2020 at 1600   • OLANZapine-FLUoxetine (SYMBYAX) 12-50 MG per capsule TAKE 1 CAPSULE  BY MOUTH ONCE DAILY (Patient taking differently: Take 1 capsule by mouth Every Evening.) 90 capsule 1 12/20/2020 at 1600   • tamsulosin (FLOMAX) 0.4 MG capsule 24 hr capsule Take 1 capsule by mouth once daily 30 capsule 5 12/20/2020 at 160   • topiramate (TOPAMAX) 25 MG tablet Take 1 tablet by mouth Daily. 30 tablet 6 12/20/2020 at 1600   • vitamin B-12 (CYANOCOBALAMIN) 1000 MCG tablet Take 1,000 mcg by mouth Daily.   12/20/2020 at 1600   • butalbital-acetaminophen-caffeine (FIORICET, ESGIC) -40 MG per tablet Take 1 tablet by mouth Every 4 (Four) Hours As Needed.          Allergies:  No Known Allergies     Vitals: Temp:  [98 °F (36.7 °C)] 98 °F (36.7 °C)  Heart Rate:  [55] 55  Resp:  [17] 17  BP: (146)/(49) 146/49    Review of Systems:   Within Normal Limits Abnormal   HEENT [x]    []     Cardiovascular [x]   []     Gastrointestinal [x]   []     Genitourinary [x]   []     Neurologic [x]   []     Pulmonary [x]   []       Physical Exam:   Within Normal Limits Abnormal   HEENT [x]    []     Heart [x]   []     Lungs [x]   []     Abdomen [x]   []     Extremities [x]   []       Impression: Left nuclear sclerotic cataract.     Plan: CATARACT PHACO EXTRACTION WITH INTRAOCULAR LENS IMPLANT LEFT WITH TORIC LENS (Left)     Carmelo Cortez MD  12/21/2020

## 2020-12-21 NOTE — DISCHARGE INSTRUCTIONS
No pushing, pulling, tugging,  heavy lifting, or strenuous activity.  No major decision making, driving, or drinking alcoholic beverages for 24 hours. ( due to the medications you have  received)  Always use good hand hygiene/washing techniques.  NO driving while taking pain medications.    * if you have an incision:  Check your incision area every day for signs of infection.   Check for:  * more redness, swelling, or pain  *more fluid or blood  *warmth  *pus or bad smell    To assist you in voiding:  Drink plenty of fluids  Listen to running water while attempting to void.    If you are unable to urinate and you have an uncomfortable urge to void or it has been   6 hours since you were discharged, return to the Emergency Room      Post Operative Cataract Instructions     Left Eye  POST OPERATIVE INSTRUCTIONS  • You have received anesthesia today. DO NOT drive, drink alcohol, sign legal documents.   • After surgery, your eye will not hurt. It may feel scratchy, sticky or uncomfortable. Your eye will be sensitive to light.  • Most people see better 1-3 days after the procedure, but it could take 3 weeks to get the full benefits and reach your visual potential. If your vision is blurry for a few days it is normal, and means you may have swelling outside or inside the eye. For some patients, a bubble is placed and there will be blurriness.   • You should receive a post-op kit with tape and an eye shield. Wear the shield for the first 3 nights after surgery to keep you from rubbing the eye.  • You may wear glasses or sunglasses during the day.  You must keep eye protected at all times.  • Most people are able to return to their normal routine 1-3 days after surgery, however, due to the brain adjusting to your new vision you may have trouble judging distances and want to be careful when driving and going up and downstairs.   • You can shower and wash your hair the day after surgery. Keep water, shampoo, hair spray and  shaving lotion out of the eye, especially for the first week.   • If eyes become stuck together after surgery you may soak with warm cloth.  • During the first week, you should AVOID:   1. Rubbing or putting pressure on your eye.  2. Eye make-up, face cream or lotion, hair coloring or perms  3. Strenuous activities, such as running or lifting weights, as to avoid sweat from getting in the eye. Avoid swimming, hot tubs, fumes or ayan conditions.   4. Sleeping on operative side.  5. Excessive sneezing or coughing.  6. Hanging the head down for periods of time. Keep your head above your heart.    Some discomfort and blurred vision after surgery are normal, but if you have any unusual pain, swelling, bleeding or sudden decrease in vision, contact our office immediately.     Emergency assistance is available at any time by calling:    Dr.Mark Diego Cortez  [phone]    If unable to reach call [facility] @ [phone]    You have been prescribed an eye drop to use after surgery, please follow these instructions and bring eye drops and instructions with you to post op appointment:    Prednisolone Acetate: Shake well before use    USE 1 DROP 4 (FOUR) TIMES A DAY FOR 1 WEEK THEN STARTING WEEK 2, ONLY USE 2 (TWO) TIMES A DAY UNTIL YOU RUN OUT OF DROPS.     PLACE A VIOLETA IN THE DAY COLUMN EACH TIME YOU USE TO KEEP ON SCHEDULE    WEEK 1-USE 4  (FOUR) TIMES A DAY DAY 1  []   []    []   []     DAY 2  []   []    []   []  DAY 3  []   []    []   []  DAY 4  []   []    []   []  DAY 5  []   []    []   []  DAY 6  []   []    []   []  DAY 7  []   []    []   []      WEEK 2-USE 2 (TWO)  TIMES A DAY DAY 1  []   []  DAY 2  []   []  DAY 3  []   []  DAY 4  []   []  DAY 5  []   []  DAY 6  []   []  DAY 7  []   []      WEEK 3-USE 2 (TWO) TIMES A DAY DAY 1  []   []  DAY 2  []   []  DAY 3  []   []  DAY 4  []   []  DAY 5  []   []  DAY 6  []   []  DAY 7  []   []       WEEK 4-USE 2 (TWO)TIMES A DAY DAY 1  []   []  DAY 2  []   []  DAY 3  []   []  DAY 4  []   []  DAY 5  []   []  DAY 6  []   []  DAY 7  []   []

## 2021-02-23 ENCOUNTER — OFFICE VISIT (OUTPATIENT)
Dept: INTERNAL MEDICINE | Facility: CLINIC | Age: 81
End: 2021-02-23

## 2021-02-23 VITALS — TEMPERATURE: 98.5 F

## 2021-02-23 DIAGNOSIS — J06.9 VIRAL URI WITH COUGH: ICD-10-CM

## 2021-02-23 DIAGNOSIS — Z20.822 CLOSE EXPOSURE TO COVID-19 VIRUS: Primary | ICD-10-CM

## 2021-02-23 PROCEDURE — 99441 PR PHYS/QHP TELEPHONE EVALUATION 5-10 MIN: CPT | Performed by: INTERNAL MEDICINE

## 2021-02-23 PROCEDURE — U0004 COV-19 TEST NON-CDC HGH THRU: HCPCS | Performed by: INTERNAL MEDICINE

## 2021-02-23 NOTE — PROGRESS NOTES
..You have chosen to receive care through a telephone visit. Do you consent to use a telephone visit for your medical care today? Yes    PCKW    Jeferson Blair, DO  Maddi Champagne, TIFFANY  Sharifa Mercer-Wife    Chief Complaint   Patient presents with   • Cough     Pt son tested positive on 2/21 for COVID.  The son lives with him.  He has developed a cough and per wife is running a low grade fever.       Subjective     History of Present Illness   Sharifa Mercer is a 80 y.o. male presenting for follow up via telephone visit with concerns of exposure to COVID-19. His son who lives with him tested positive on 2/21/21.  He now has a dry cough and feels he has a low grade fever.  He also has muscle aches in his back which are new.  Denies SOA or loss of taste or smell.         The following portions of the patient's history were reviewed and updated as appropriate: allergies, current medications, past family history, past medical history, past social history, past surgical history and problem list.    Review of Systems   Constitutional: Negative for chills, fatigue and fever.   HENT: Positive for congestion. Negative for ear pain, rhinorrhea, sinus pressure and sore throat.    Eyes: Negative for visual disturbance.   Respiratory: Positive for cough. Negative for chest tightness, shortness of breath and wheezing.    Cardiovascular: Negative for chest pain, palpitations and leg swelling.   Gastrointestinal: Negative for abdominal pain, blood in stool, constipation, diarrhea, nausea and vomiting.   Endocrine: Negative for polydipsia and polyuria.   Genitourinary: Negative for dysuria and hematuria.   Musculoskeletal: Positive for back pain and myalgias. Negative for arthralgias.   Skin: Negative for rash.   Neurological: Negative for dizziness, light-headedness, numbness and headaches.   Psychiatric/Behavioral: Negative for dysphoric mood and sleep disturbance. The patient is not nervous/anxious.        No Known  Allergies    Past Medical History:   Diagnosis Date   • Adhesive capsulitis of left shoulder 2016    Dr. Barrera   • B12 deficiency    • Bipolar depression (CMS/[facility])    • BPH with urinary obstruction    • Cataract, bilateral     s/p surgery on the right   • Cerebrovascular disease    • Elevated cholesterol    • Gallstones    • GERD (gastroesophageal reflux disease)    • GI bleeding    • History of nuclear stress test     unsure of date   • [place]asta (hard of hearing)     bilateral hearing aids   • Hypertension    • Iron deficiency anemia     likely secondary to Xarelto use.   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) 2015   • Saphenous vein clot    • Wears glasses        Social History     Socioeconomic History   • Marital status:      Spouse name: Not on file   • Number of children: Not on file   • Years of education: Not on file   • Highest education level: Not on file   Tobacco Use   • Smoking status: Former Smoker     Packs/day: 1.00     Years: 10.00     Pack years: 10.00     Types: Cigarettes     Quit date:      Years since quittin.1   • Smokeless tobacco: Never Used   Substance and Sexual Activity   • Alcohol use: Yes     Comment: maybe a drink of liquor every 2 months   • Drug use: No   • Sexual activity: Defer   Social History Narrative     to Janessa. Retired from [employer].        Past Surgical History:   Procedure Laterality Date   • APPENDECTOMY     • CAPSULE ENDOSCOPY     • CARDIAC CATHETERIZATION      no intervention   • CATARACT EXTRACTION W/ INTRAOCULAR LENS IMPLANT Right 2020    Procedure: CATARACT PHACO EXTRACTION WITH INTRAOCULAR LENS IMPLANT RIGHT WITH TORIC LENS;  Surgeon: Carmelo Cortez MD;  Location: [facility];  Service: Ophthalmology;  Laterality: Right;   • CATARACT EXTRACTION  W/ INTRAOCULAR LENS IMPLANT Left 12/21/2020    Procedure: CATARACT PHACO EXTRACTION WITH INTRAOCULAR LENS IMPLANT LEFT WITH TORIC LENS;  Surgeon: Carmelo Cortez MD;  Location: [facility];  Service: Ophthalmology;  Laterality: Left;   • CHOLECYSTECTOMY     • COLONOSCOPY     • ENDOSCOPY     • WISDOM TOOTH EXTRACTION         Family History   Problem Relation Age of Onset   • Cancer Mother    • Diabetes Mother    • Stroke Mother    • Heart disease Mother    • Migraines Sister    • Breast cancer Sister          Current Outpatient Medications:   •  ALPRAZolam (XANAX) 0.5 MG tablet, Take 1 tablet by mouth Daily As Needed for Anxiety., Disp: 30 tablet, Rfl: 1  •  aspirin (ASPIRIN LOW DOSE) 81 MG EC tablet, Take 81 mg by mouth Daily., Disp: , Rfl:   •  atorvastatin (LIPITOR) 40 MG tablet, TAKE 1 TABLET BY MOUTH ONCE DAILY, Disp: 90 tablet, Rfl: 1  •  Cholecalciferol 125 MCG (5000 UT) tablet, Take 5,000 Units by mouth Daily., Disp: , Rfl:   •  influenza vac split quad (Flulaval Quadrivalent) 0.5 ML suspension prefilled syringe injection, Inject as directed per protocol, Disp: 0.5 mL, Rfl: 0  •  lansoprazole (PREVACID) 30 MG capsule, Take 1 capsule by mouth once Daily (Patient taking differently: Take 30 mg by mouth Daily.), Disp: 90 capsule, Rfl: 1  •  loratadine (CLARITIN) 10 MG tablet, Take 1 tablet by mouth Daily as needed for post nasal drainage, runny nose and sneezing, Disp: 90 tablet, Rfl: 3  •  LORazepam (ATIVAN) 0.5 MG tablet, Take 1 tablet by mouth At Night As Needed for sleep, Disp: 90 tablet, Rfl: 1  •  metoprolol tartrate (LOPRESSOR) 25 MG tablet, TAKE 1 TABLET BY MOUTH 2 (TWO) TIMES A DAY, Disp: 180 tablet, Rfl: 1  •  nortriptyline (PAMELOR) 25 MG capsule, Take 25 mg by mouth Every Night., Disp: , Rfl:   •  OLANZapine-FLUoxetine (SYMBYAX) 12-50 MG per capsule, TAKE 1 CAPSULE BY MOUTH ONCE DAILY (Patient taking differently: Take 1 capsule by mouth Every Evening.), Disp: 90 capsule, Rfl: 1  •  prednisoLONE  acetate (Pred Forte) 1 % ophthalmic suspension, Follow instructions on the After Visit Summary., Disp: 2 mL, Rfl: 0  •  tamsulosin (FLOMAX) 0.4 MG capsule 24 hr capsule, Take 1 capsule by mouth once daily, Disp: 30 capsule, Rfl: 5  •  topiramate (TOPAMAX) 25 MG tablet, Take 1 tablet by mouth Daily., Disp: 30 tablet, Rfl: 6  •  vitamin B-12 (CYANOCOBALAMIN) 1000 MCG tablet, Take 1,000 mcg by mouth Daily., Disp: , Rfl:   •  Misc. Devices (Fingertip Pulse Oximeter) misc, 1 each Daily., Disp: 1 each, Rfl: 0    Objective   Temp 98.5 °F (36.9 °C)   Unable to obtain vitals    Physical Exam  Unable to perform due to phone visit.     Assessment/Plan   Diagnoses and all orders for this visit:    1. Close exposure to COVID-19 virus (Primary)  -     Misc. Devices (Fingertip Pulse Oximeter) misc; 1 each Daily.  Dispense: 1 each; Refill: 0  -     COVID-19 PCR, [facility], NP SWAB IN [facility]SP[facility] MEDIA 24-30 HR TAT - Swab, Nasopharynx; Future    2. Viral URI with cough  -     Misc. Devices (Fingertip Pulse Oximeter) misc; 1 each Daily.  Dispense: 1 each; Refill: 0  -     COVID-19 PCR, [facility], NP SWAB IN [facility] TRANSPORT MEDIA 24-30 HR TAT - Swab, Nasopharynx; Future          Discussion Summary:  Patient is a 80 y.o. male presenting for follow up with concerns of viral URI and close contact with COVID-19.  Patient advised to start Tylenol and Mucinex for symptom management.  COVID-19 PCR test ordered today.  We did discuss options for treatment should he turn positive.  We also discussed methods for quarantine including wearing masks at home and open areas.  I advised him sleeping in separate rooms tonight until results are obtained tomorrow.    Total time spent for this encounter was 8 minutes in addressing above listed concerns.     Follow up:  No follow-ups on file.

## 2021-02-24 ENCOUNTER — TELEPHONE (OUTPATIENT)
Dept: INTERNAL MEDICINE | Facility: CLINIC | Age: 81
End: 2021-02-24

## 2021-02-24 LAB — SARS-COV-2 RNA RESP QL NAA+PROBE: DETECTED

## 2021-02-24 NOTE — PROGRESS NOTES
Results discussed with patients.  Agree to infusion therapy with monoclonal antibodies.  Orders placed.

## 2021-02-25 ENCOUNTER — TELEPHONE (OUTPATIENT)
Dept: INTERNAL MEDICINE | Facility: CLINIC | Age: 81
End: 2021-02-25

## 2021-02-25 NOTE — TELEPHONE ENCOUNTER
Caller: DESHAWN WOODRUFF     Relationship to patient: WIFE     Best call back number: [phone]    Concerns or Questions if Applicable: PATIENT CALLED AND STATED HAD PHONE VISIT WITH DR. LUCERO ON 2/23/2021. TESTED POSITIVE FOR COVID  WITH RESULTS YESTERDAY.  DR. LUCERO HAD MENTIONED THAT HE WOULD BE A CANDIDATE FOR THE COVID INFUSION THERAPY BUT PATIENT DIDN'T THINK HE COULD GET TO [place] TO HAVE THAT DONE.  HAS SINCE FOUND OUT THAT  [facility] CAN TAKE THEM.  PLEASE CALL AND ADVISE:   [phone]

## 2021-02-27 ENCOUNTER — HOSPITAL ENCOUNTER (OUTPATIENT)
Facility: HOSPITAL | Age: 81
Discharge: HOME | End: 2021-02-27
Attending: INTERNAL MEDICINE | Admitting: INTERNAL MEDICINE

## 2021-02-27 VITALS
WEIGHT: 170 LBS | SYSTOLIC BLOOD PRESSURE: 125 MMHG | HEART RATE: 52 BPM | TEMPERATURE: 98 F | BODY MASS INDEX: 26.68 KG/M2 | RESPIRATION RATE: 16 BRPM | OXYGEN SATURATION: 95 % | DIASTOLIC BLOOD PRESSURE: 60 MMHG | HEIGHT: 67 IN

## 2021-02-27 PROBLEM — U07.1 COVID-19 VIRUS INFECTION: Status: ACTIVE | Noted: 2021-02-27

## 2021-02-27 PROCEDURE — 25010000002 INJECTION, CASIRIVIMAB AND IMDEVIMAB, 1200 MG: Performed by: INTERNAL MEDICINE

## 2021-02-27 PROCEDURE — 99213 OFFICE O/P EST LOW 20 MIN: CPT | Performed by: INTERNAL MEDICINE

## 2021-02-27 PROCEDURE — 25010000006 INJECTION, CASIRIVIMAB AND IMDEVIMAB, 2400 MG: Performed by: INTERNAL MEDICINE

## 2021-02-27 PROCEDURE — M0243 CASIRIVI AND IMDEVI INFUSION: HCPCS | Performed by: INTERNAL MEDICINE

## 2021-02-27 RX ORDER — DIPHENHYDRAMINE HYDROCHLORIDE 50 MG/ML
50 INJECTION INTRAMUSCULAR; INTRAVENOUS ONCE AS NEEDED
Status: DISCONTINUED | OUTPATIENT
Start: 2021-02-27 | End: 2021-02-27 | Stop reason: HOSPADM

## 2021-02-27 RX ORDER — SODIUM CHLORIDE 9 MG/ML
30 INJECTION, SOLUTION INTRAVENOUS ONCE
Status: COMPLETED | OUTPATIENT
Start: 2021-02-27 | End: 2021-02-27

## 2021-02-27 RX ORDER — EPINEPHRINE 1 MG/ML
0.3 INJECTION, SOLUTION INTRAMUSCULAR; SUBCUTANEOUS ONCE AS NEEDED
Status: DISCONTINUED | OUTPATIENT
Start: 2021-02-27 | End: 2021-02-27 | Stop reason: HOSPADM

## 2021-02-27 RX ORDER — METHYLPREDNISOLONE SODIUM SUCCINATE 125 MG/2ML
125 INJECTION, POWDER, LYOPHILIZED, FOR SOLUTION INTRAMUSCULAR; INTRAVENOUS ONCE AS NEEDED
Status: DISCONTINUED | OUTPATIENT
Start: 2021-02-27 | End: 2021-02-27 | Stop reason: HOSPADM

## 2021-02-27 RX ADMIN — IMDEVIMAB: 300 INJECTION, SOLUTION, CONCENTRATE INTRAVENOUS at 09:32

## 2021-02-27 RX ADMIN — SODIUM CHLORIDE 30 ML: 9 INJECTION, SOLUTION INTRAVENOUS at 10:37

## 2021-03-02 ENCOUNTER — TELEPHONE (OUTPATIENT)
Dept: INTERNAL MEDICINE | Facility: CLINIC | Age: 81
End: 2021-03-02

## 2021-03-02 NOTE — TELEPHONE ENCOUNTER
Patient's wife states that he had the COVID infusion on 2/27/21.  He can be reached at [phone]

## 2021-11-01 ENCOUNTER — OFFICE VISIT (OUTPATIENT)
Dept: INTERNAL MEDICINE | Facility: CLINIC | Age: 81
End: 2021-11-01

## 2021-11-01 VITALS
TEMPERATURE: 97.3 F | DIASTOLIC BLOOD PRESSURE: 54 MMHG | SYSTOLIC BLOOD PRESSURE: 133 MMHG | RESPIRATION RATE: 16 BRPM | WEIGHT: 179.8 LBS | BODY MASS INDEX: 28.22 KG/M2 | HEIGHT: 67 IN | OXYGEN SATURATION: 100 % | HEART RATE: 52 BPM

## 2021-11-01 DIAGNOSIS — Z00.00 MEDICARE ANNUAL WELLNESS VISIT, SUBSEQUENT: Primary | ICD-10-CM

## 2021-11-01 DIAGNOSIS — H92.01 OTALGIA, RIGHT: ICD-10-CM

## 2021-11-01 DIAGNOSIS — E78.49 FAMILIAL COMBINED HYPERLIPIDEMIA: ICD-10-CM

## 2021-11-01 DIAGNOSIS — H61.22 HEARING LOSS DUE TO CERUMEN IMPACTION, LEFT: ICD-10-CM

## 2021-11-01 DIAGNOSIS — F31.9 BIPOLAR DEPRESSION (HCC): Chronic | ICD-10-CM

## 2021-11-01 DIAGNOSIS — Z28.21 COVID-19 VACCINATION DECLINED: ICD-10-CM

## 2021-11-01 DIAGNOSIS — I63.81 LACUNAR INFARCTION (HCC): ICD-10-CM

## 2021-11-01 PROBLEM — I82.409 DEEP VENOUS THROMBOSIS: Status: RESOLVED | Noted: 2017-05-24 | Resolved: 2021-11-01

## 2021-11-01 PROCEDURE — 1159F MED LIST DOCD IN RCRD: CPT | Performed by: FAMILY MEDICINE

## 2021-11-01 PROCEDURE — G0439 PPPS, SUBSEQ VISIT: HCPCS | Performed by: FAMILY MEDICINE

## 2021-11-01 PROCEDURE — 1126F AMNT PAIN NOTED NONE PRSNT: CPT | Performed by: FAMILY MEDICINE

## 2021-11-01 PROCEDURE — 99397 PER PM REEVAL EST PAT 65+ YR: CPT | Performed by: FAMILY MEDICINE

## 2021-11-01 PROCEDURE — 69209 REMOVE IMPACTED EAR WAX UNI: CPT | Performed by: FAMILY MEDICINE

## 2021-11-01 PROCEDURE — 1170F FXNL STATUS ASSESSED: CPT | Performed by: FAMILY MEDICINE

## 2021-11-01 PROCEDURE — 96160 PT-FOCUSED HLTH RISK ASSMT: CPT | Performed by: FAMILY MEDICINE

## 2021-11-01 NOTE — PATIENT INSTRUCTIONS
Medicare Wellness  Personal Prevention Plan of Service     Date of Office Visit:  2021  Encounter Provider:  Karena Spivey MD  Place of Service:  [facility]  Patient Name: Sharifa Mercer  :  1940    As part of the Medicare Wellness portion of your visit today, we are providing you with this personalized preventive plan of services (PPPS). This plan is based upon recommendations of the [place] Preventive Services Task Force (USPSTF) and the Advisory Committee on Immunization Practices (ACIP).    This lists the preventive care services that should be considered, and provides dates of when you are due. Items listed as completed are up-to-date and do not require any further intervention.    Health Maintenance   Topic Date Due   • LIPID PANEL  2018   • ANNUAL WELLNESS VISIT  10/30/2021   • COVID-19 Vaccine (1) 2021 (Originally 1952)   • ZOSTER VACCINE (1 of 2) 2021 (Originally 1988)   • TDAP/TD VACCINES (2 - Tdap) 2025   • INFLUENZA VACCINE  Completed   • Pneumococcal Vaccine 65+  Completed       No orders of the defined types were placed in this encounter.      Return in about 1 year (around 2022) for Medicare Wellness (366 days from last AWV).

## 2021-11-01 NOTE — PROGRESS NOTES
The ABCs of the Annual Wellness Visit  Subsequent Medicare Wellness Visit    Chief Complaint   Patient presents with   • Medicare Wellness-subsequent     annual      Subjective    History of Present Illness:  Sharifa Mercer is a 81 y.o. male who presents for a Subsequent Medicare Wellness Visit.    The following portions of the patient's history were reviewed and   updated as appropriate: allergies, current medications, past family history, past medical history, past social history, past surgical history and problem list.    Compared to one year ago, the patient feels his physical   health is the same.    Compared to one year ago, the patient feels his mental   health is the same.    Recent Hospitalizations:  He was not admitted to the hospital during the last year.       Current Medical Providers:  Patient Care Team:  Karena Spivey MD as PCP - General (Family Medicine)  Alverto Terrazas MD as Consulting Physician (Cardiology)  Sb Sánchez DPM as Consulting Physician (Podiatry)  Carmelo Cortez MD as Consulting Physician (Ophthalmology)  Dilshad Davison OD (Optometry)  Katie Barnes MD as Consulting Physician (Hematology)    Outpatient Medications Prior to Visit   Medication Sig Dispense Refill   • ALPRAZolam (XANAX) 0.5 MG tablet Take 1 tablet by mouth Daily As Needed for Anxiety. 30 tablet 1   • aspirin (ASPIRIN LOW DOSE) 81 MG EC tablet Take 81 mg by mouth Daily.     • atorvastatin (LIPITOR) 40 MG tablet TAKE 1 TABLET BY MOUTH ONCE DAILY 90 tablet 1   • Cholecalciferol 125 MCG (5000 UT) tablet Take 5,000 Units by mouth Daily.     • lansoprazole (PREVACID) 30 MG capsule Take 1 capsule by mouth once Daily (Patient taking differently: Take 30 mg by mouth Daily.) 90 capsule 1   • loratadine (CLARITIN) 10 MG tablet Take 1 tablet by mouth Daily as needed for post nasal drainage, runny nose and sneezing 90 tablet 3   • LORazepam (ATIVAN) 0.5 MG tablet Take 1 tablet by mouth At  Night As Needed for sleep 90 tablet 1   • metoprolol tartrate (LOPRESSOR) 25 MG tablet TAKE 1 TABLET BY MOUTH 2 (TWO) TIMES A  tablet 1   • Misc. Devices (Fingertip Pulse Oximeter) misc 1 each Daily. 1 each 0   • nortriptyline (PAMELOR) 25 MG capsule Take 25 mg by mouth Every Night.     • OLANZapine-FLUoxetine (SYMBYAX) 12-50 MG per capsule TAKE 1 CAPSULE BY MOUTH ONCE DAILY (Patient taking differently: Take 1 capsule by mouth Every Evening.) 90 capsule 1   • prednisoLONE acetate (Pred Forte) 1 % ophthalmic suspension Follow instructions on the After Visit Summary. 2 mL 0   • tamsulosin (FLOMAX) 0.4 MG capsule 24 hr capsule Take 1 capsule by mouth once daily 30 capsule 5   • topiramate (TOPAMAX) 25 MG tablet Take 1 tablet by mouth Daily. 30 tablet 6   • vitamin B-12 (CYANOCOBALAMIN) 1000 MCG tablet Take 1,000 mcg by mouth Daily.     • influenza vac split quad (Flulaval Quadrivalent) 0.5 ML suspension prefilled syringe injection Inject as directed per protocol 0.5 mL 0     No facility-administered medications prior to visit.       No opioid medication identified on active medication list. I have reviewed chart for other potential  high risk medication/s and harmful drug interactions in the elderly.          Aspirin is on active medication list. Aspirin use is indicated based on review of current medical condition/s. Pros and cons of this therapy have been discussed today. Benefits of this medication outweigh potential harm.  Patient has been encouraged to continue taking this medication.  .      Patient Active Problem List   Diagnosis   • RAMONITA (obstructive sleep apnea)   • Migraine without aura and without status migrainosus, not intractable   • Lupus anticoagulant positive   • Bipolar depression (HCC)   • B12 deficiency   • Lacunar infarction (HCC)   • BPH with urinary obstruction   • Cerebrovascular disease   • Overweight (BMI 25.0-29.9)   • Anemia   • Anxiety   • Familial combined hyperlipidemia   • Primary  "insomnia   • Nuclear sclerotic cataract of right eye   • Nuclear sclerotic cataract of left eye   • COVID-19 virus infection   • COVID-19 vaccination declined     Advance Care Planning  Advance Directive is not on file.  ACP discussion was held with the patient during this visit. Patient has an advance directive (not in EMR), copy requested.    Review of Systems   Constitutional: Negative for fever.   HENT: Positive for ear pain.    All other systems reviewed and are negative.       Objective    Vitals:    11/01/21 1109   BP: 133/54   BP Location: Right arm   Patient Position: Sitting   Cuff Size: Adult   Pulse: 52   Resp: 16   Temp: 97.3 °F (36.3 °C)   TempSrc: Temporal   SpO2: 100%   Weight: 81.6 kg (179 lb 12.8 oz)   Height: 170.2 cm (67\")   PainSc: 0-No pain     BMI Readings from Last 1 Encounters:   11/01/21 28.16 kg/m²   BMI is above normal parameters. Recommendations include: educational material    Does the patient have evidence of cognitive impairment? No    Physical Exam  Vitals and nursing note reviewed.   Constitutional:       General: He is not in acute distress.     Appearance: Normal appearance. He is well-developed, well-groomed and overweight. He is not ill-appearing, toxic-appearing or diaphoretic.      Interventions: Face mask in place.   HENT:      Head: Normocephalic and atraumatic.      Right Ear: Tympanic membrane, ear canal and external ear normal. Decreased hearing noted.      Left Ear: External ear normal. Decreased hearing noted. There is impacted cerumen.   Eyes:      General: Lids are normal. No scleral icterus.     Extraocular Movements: Extraocular movements intact.   Cardiovascular:      Rate and Rhythm: Regular rhythm. Bradycardia present.      Heart sounds: Normal heart sounds.   Pulmonary:      Effort: Pulmonary effort is normal.      Breath sounds: Normal breath sounds and air entry.   Musculoskeletal:      Cervical back: Neck supple.   Skin:     Coloration: Skin is not jaundiced " or pale.   Neurological:      General: No focal deficit present.      Mental Status: He is alert and oriented to person, place, and time.   Psychiatric:         Attention and Perception: Attention and perception normal.         Mood and Affect: Mood and affect normal.         Speech: Speech normal.         Behavior: Behavior normal. Behavior is cooperative.         Thought Content: Thought content normal.         Cognition and Memory: Cognition and memory normal.         Judgment: Judgment normal.                 HEALTH RISK ASSESSMENT    Smoking Status:  Social History     Tobacco Use   Smoking Status Former Smoker   • Packs/day: 1.00   • Years: 10.00   • Pack years: 10.00   • Types: Cigarettes   • Quit date:    • Years since quittin.8   Smokeless Tobacco Never Used     Alcohol Consumption:  Social History     Substance and Sexual Activity   Alcohol Use Yes   • Alcohol/week: 1.0 standard drink   • Types: 1 Shots of liquor per week    Comment: 1 Drank at bed time     Fall Risk Screen:    KIERAN Fall Risk Assessment was completed, and patient is at LOW risk for falls.Assessment completed on:2021    Depression Screening:  PHQ-2/PHQ-9 Depression Screening 2021   Little interest or pleasure in doing things 0   Feeling down, depressed, or hopeless 0   Total Score 0       Health Habits and Functional and Cognitive Screening:  Functional & Cognitive Status 2021   Do you have difficulty preparing food and eating? No   Do you have difficulty bathing yourself, getting dressed or grooming yourself? No   Do you have difficulty using the toilet? No   Do you have difficulty moving around from place to place? No   Do you have trouble with steps or getting out of a bed or a chair? No   Current Diet Well Balanced Diet   Dental Exam Up to date   Eye Exam Up to date   Exercise (times per week) 0 times per week   Current Exercises Include No Regular Exercise   Current Exercise Activities Include -   Do you need  help using the phone?  No   Are you deaf or do you have serious difficulty hearing?  Yes   Do you need help with transportation? No   Do you need help shopping? No   Do you need help preparing meals?  No   Do you need help with housework?  No   Do you need help with laundry? No   Do you need help taking your medications? No   Do you need help managing money? No   Do you ever drive or ride in a car without wearing a seat belt? No   Have you felt unusual stress, anger or loneliness in the last month? No   Who do you live with? Spouse   If you need help, do you have trouble finding someone available to you? No   Have you been bothered in the last four weeks by sexual problems? No   Do you have difficulty concentrating, remembering or making decisions? No       Age-appropriate Screening Schedule:  Refer to the list below for future screening recommendations based on patient's age, sex and/or medical conditions. Orders for these recommended tests are listed in the plan section. The patient has been provided with a written plan.    Health Maintenance   Topic Date Due   • LIPID PANEL  11/14/2018   • ZOSTER VACCINE (1 of 2) 11/08/2021 (Originally 6/16/1990)   • TDAP/TD VACCINES (2 - Tdap) 07/13/2025   • INFLUENZA VACCINE  Completed              Assessment/Plan   CMS Preventative Services Quick Reference  Risk Factors Identified During Encounter  Cardiovascular Disease  Hearing Problem  Immunizations Discussed/Encouraged (specific Immunizations; Influenza and COVID19  The above risks/problems have been discussed with the patient.  Follow up actions/plans if indicated are seen below in the Assessment/Plan Section.  Pertinent information has been shared with the patient in the After Visit Summary.    Diagnoses and all orders for this visit:    1. Medicare annual wellness visit, subsequent (Primary)    2. Lacunar infarction (HCC)  Comments:  continue statin, aspirin, keep bp controlled    3. Bipolar depression  ([facility])  Comments:  continue Symbyax, alprazolam per behavioral health    4. Hearing loss due to cerumen impaction, left  Comments:  cerumen cleared by MA, irrigation    5. Otalgia, right  Comments:  discussed autoinflation.    6. Familial combined hyperlipidemia  Comments:  [facility]    7. COVID-19 vaccination declined        Follow Up:   Return in about 1 year (around 11/4/2022) for Medicare Wellness (366 days from last AWV).     An After Visit Summary and PPPS were made available to the patient.

## 2022-05-17 ENCOUNTER — TELEPHONE (OUTPATIENT)
Dept: INTERNAL MEDICINE | Facility: CLINIC | Age: 82
End: 2022-05-17

## 2022-05-17 NOTE — TELEPHONE ENCOUNTER
Patient's wife notified exchange 5/16/2022 at 1851 regarding patient's positive home COVID test.  Call was returned and I spoke with wife regarding patient's symptoms and she stated that he was coughing but otherwise was doing okay.  She was able to check his pulse oximetry and it was 96 to 98% and she stated he appeared in no distress.  She denied discussed quarantine time nonpharmacological interventions in addition to over-the-counter medications to take.  She stated that her and her  had been exposed the previous week and that her test was negative.  She and I also worsening signs and symptoms and she stated an understanding.  She stated that both her and her  (patient) had COVID in the past.

## 2022-11-03 ENCOUNTER — OFFICE VISIT (OUTPATIENT)
Dept: INTERNAL MEDICINE | Facility: CLINIC | Age: 82
End: 2022-11-03

## 2022-11-03 VITALS
HEIGHT: 67 IN | DIASTOLIC BLOOD PRESSURE: 74 MMHG | OXYGEN SATURATION: 98 % | SYSTOLIC BLOOD PRESSURE: 116 MMHG | RESPIRATION RATE: 16 BRPM | WEIGHT: 180.2 LBS | TEMPERATURE: 97.5 F | BODY MASS INDEX: 28.28 KG/M2 | HEART RATE: 51 BPM

## 2022-11-03 DIAGNOSIS — Z51.81 MEDICATION MONITORING ENCOUNTER: ICD-10-CM

## 2022-11-03 DIAGNOSIS — E66.3 OVERWEIGHT WITH BODY MASS INDEX (BMI) OF 28 TO 28.9 IN ADULT: ICD-10-CM

## 2022-11-03 DIAGNOSIS — R93.89 ABNORMAL FINDINGS ON DIAGNOSTIC IMAGING OF OTHER SPECIFIED BODY STRUCTURES: ICD-10-CM

## 2022-11-03 DIAGNOSIS — I73.9 MICROANGIOPATHY: ICD-10-CM

## 2022-11-03 DIAGNOSIS — E55.9 VITAMIN D DEFICIENCY, UNSPECIFIED: ICD-10-CM

## 2022-11-03 DIAGNOSIS — Z00.00 MEDICARE ANNUAL WELLNESS VISIT, SUBSEQUENT: Primary | ICD-10-CM

## 2022-11-03 DIAGNOSIS — E78.49 FAMILIAL COMBINED HYPERLIPIDEMIA: ICD-10-CM

## 2022-11-03 DIAGNOSIS — D69.6 PLATELETS DECREASED: ICD-10-CM

## 2022-11-03 DIAGNOSIS — E53.8 B12 DEFICIENCY: ICD-10-CM

## 2022-11-03 DIAGNOSIS — F31.9 BIPOLAR DEPRESSION: ICD-10-CM

## 2022-11-03 DIAGNOSIS — R79.9 ABNORMAL BLOOD CHEMISTRY: ICD-10-CM

## 2022-11-03 PROBLEM — Z86.16 HISTORY OF COVID-19: Status: ACTIVE | Noted: 2021-02-27

## 2022-11-03 PROCEDURE — G0439 PPPS, SUBSEQ VISIT: HCPCS | Performed by: FAMILY MEDICINE

## 2022-11-03 PROCEDURE — 1170F FXNL STATUS ASSESSED: CPT | Performed by: FAMILY MEDICINE

## 2022-11-03 PROCEDURE — 1159F MED LIST DOCD IN RCRD: CPT | Performed by: FAMILY MEDICINE

## 2022-11-03 PROCEDURE — 96160 PT-FOCUSED HLTH RISK ASSMT: CPT | Performed by: FAMILY MEDICINE

## 2022-11-03 PROCEDURE — 99397 PER PM REEVAL EST PAT 65+ YR: CPT | Performed by: FAMILY MEDICINE

## 2022-11-03 NOTE — PROGRESS NOTES
The ABCs of the Annual Wellness Visit  Subsequent Medicare Wellness Visit    Chief Complaint   Patient presents with   • Medicare Wellness-subsequent     AWV and preventive care      Subjective    History of Present Illness:  Sharifa Mercer is a 82 y.o. male who presents for a Subsequent Medicare Wellness Visit.    The following portions of the patient's history were reviewed and   updated as appropriate: allergies, current medications, past family history, past medical history, past social history, past surgical history and problem list.    Compared to one year ago, the patient feels his physical   health is the same.    Compared to one year ago, the patient feels his mental   health is the same.    Recent Hospitalizations:  He was not admitted to the hospital during the last year.       Current Medical Providers:  Patient Care Team:  Karena Spivey MD as PCP - General (Family Medicine)  Alverto Terrazas MD as Consulting Physician (Cardiology)  Sb Sánchez DPM as Consulting Physician (Podiatry)  Carmelo Cortez MD as Consulting Physician (Ophthalmology)  Dilshad Davison OD (Optometry)  Katie Barnes MD as Consulting Physician (Hematology)    Outpatient Medications Prior to Visit   Medication Sig Dispense Refill   • aspirin 81 MG EC tablet Take 81 mg by mouth Daily.     • atorvastatin (LIPITOR) 40 MG tablet TAKE 1 TABLET BY MOUTH ONCE DAILY 90 tablet 1   • Cholecalciferol 125 MCG (5000 UT) tablet Take 5,000 Units by mouth Daily.     • lansoprazole (PREVACID) 30 MG capsule Take 1 capsule by mouth once Daily (Patient taking differently: Take 1 capsule by mouth Daily.) 90 capsule 1   • loratadine (CLARITIN) 10 MG tablet Take 1 tablet by mouth Daily as needed for post nasal drainage, runny nose and sneezing 90 tablet 3   • LORazepam (ATIVAN) 0.5 MG tablet Take 1 tablet by mouth At Night As Needed for sleep 90 tablet 1   • metoprolol tartrate (LOPRESSOR) 25 MG tablet TAKE 1  TABLET BY MOUTH 2 (TWO) TIMES A  tablet 1   • Misc. Devices (Fingertip Pulse Oximeter) misc 1 each Daily. 1 each 0   • nortriptyline (PAMELOR) 25 MG capsule Take 25 mg by mouth Every Night.     • OLANZapine-FLUoxetine (SYMBYAX) 12-50 MG per capsule TAKE 1 CAPSULE BY MOUTH ONCE DAILY (Patient taking differently: Take 1 capsule by mouth Every Evening.) 90 capsule 1   • tamsulosin (FLOMAX) 0.4 MG capsule 24 hr capsule Take 1 capsule by mouth once daily 30 capsule 5   • topiramate (TOPAMAX) 25 MG tablet Take 1 tablet by mouth Daily. 30 tablet 6   • vitamin B-12 (CYANOCOBALAMIN) 1000 MCG tablet Take 1,000 mcg by mouth Daily.     • ALPRAZolam (XANAX) 0.5 MG tablet Take 1 tablet by mouth Daily As Needed for Anxiety. 30 tablet 1   • prednisoLONE acetate (Pred Forte) 1 % ophthalmic suspension Follow instructions on the After Visit Summary. 2 mL 0     No facility-administered medications prior to visit.       No opioid medication identified on active medication list. I have reviewed chart for other potential  high risk medication/s and harmful drug interactions in the elderly.          Aspirin is on active medication list. Aspirin use is indicated based on review of current medical condition/s. Pros and cons of this therapy have been discussed today. Benefits of this medication outweigh potential harm.  Patient has been encouraged to continue taking this medication.  .      Patient Active Problem List   Diagnosis   • RAMONITA (obstructive sleep apnea)   • Migraine without aura and without status migrainosus, not intractable   • Lupus anticoagulant positive   • Bipolar depression (HCC)   • B12 deficiency   • Lacunar infarction (HCC)   • BPH with urinary obstruction   • Cerebrovascular disease   • Overweight (BMI 25.0-29.9)   • Anemia   • Anxiety   • Familial combined hyperlipidemia   • Primary insomnia   • Nuclear sclerotic cataract of right eye   • Nuclear sclerotic cataract of left eye   • History of COVID-19   • COVID-19  "vaccination declined     Advance Care Planning  Advance Directive is not on file.  ACP discussion was held with the patient during this visit. Patient does not have an advance directive, information provided.    Review of Systems   Constitutional: Negative for fever.   Respiratory: Negative for shortness of breath.    Cardiovascular: Negative for chest pain.   All other systems reviewed and are negative.       Objective    Vitals:    11/03/22 1103   BP: 116/74   BP Location: Right arm   Patient Position: Sitting   Cuff Size: Adult   Pulse: 51   Resp: 16   Temp: 97.5 °F (36.4 °C)   TempSrc: Temporal   SpO2: 98%   Weight: 81.7 kg (180 lb 3.2 oz)   Height: 170.2 cm (67\")   PainSc: 0-No pain     Estimated body mass index is 28.22 kg/m² as calculated from the following:    Height as of this encounter: 170.2 cm (67\").    Weight as of this encounter: 81.7 kg (180 lb 3.2 oz).    BMI is >= 25 and <30. (Overweight) The following options were offered after discussion;: weight loss educational material (shared in after visit summary)      Does the patient have evidence of cognitive impairment? No    Physical Exam  Vitals and nursing note reviewed.   Constitutional:       General: He is not in acute distress.     Appearance: Normal appearance. He is well-developed, well-groomed and overweight. He is not ill-appearing, toxic-appearing or diaphoretic.      Interventions: Face mask in place.   HENT:      Head: Normocephalic and atraumatic.      Right Ear: Hearing normal.      Left Ear: Hearing normal.   Eyes:      General: Lids are normal. No scleral icterus.        Right eye: No discharge.         Left eye: No discharge.      Extraocular Movements: Extraocular movements intact.   Cardiovascular:      Rate and Rhythm: Normal rate and regular rhythm.   Pulmonary:      Effort: Pulmonary effort is normal.      Breath sounds: Normal breath sounds.   Abdominal:      General: There is no distension.   Musculoskeletal:         General: No " deformity.      Cervical back: Neck supple.   Skin:     Coloration: Skin is not jaundiced or pale.   Neurological:      General: No focal deficit present.      Mental Status: He is alert and oriented to person, place, and time.   Psychiatric:         Attention and Perception: Attention and perception normal.         Mood and Affect: Mood and affect normal.         Speech: Speech normal.         Behavior: Behavior normal. Behavior is cooperative.         Thought Content: Thought content normal.         Cognition and Memory: Cognition and memory normal.         Judgment: Judgment normal.                 HEALTH RISK ASSESSMENT    Smoking Status:  Social History     Tobacco Use   Smoking Status Former   • Packs/day: 1.00   • Years: 13.00   • Pack years: 13.00   • Types: Cigarettes   • Start date: 1956   • Quit date: 1969   • Years since quittin.8   Smokeless Tobacco Never     Alcohol Consumption:  Social History     Substance and Sexual Activity   Alcohol Use Not Currently   • Alcohol/week: 1.0 standard drink   • Types: 1 Shots of liquor per week    Comment: 1 Drank at bed time     Fall Risk Screen:    KIERAN Fall Risk Assessment was completed, and patient is at LOW risk for falls.Assessment completed on:11/3/2022    Depression Screening:  PHQ-2/PHQ-9 Depression Screening 11/3/2022   Retired PHQ-9 Total Score -   Retired Total Score -   Little Interest or Pleasure in Doing Things 0-->not at all   Feeling Down, Depressed or Hopeless 0-->not at all   PHQ-9: Brief Depression Severity Measure Score 0       Health Habits and Functional and Cognitive Screening:  Functional & Cognitive Status 11/3/2022   Do you have difficulty preparing food and eating? No   Do you have difficulty bathing yourself, getting dressed or grooming yourself? No   Do you have difficulty using the toilet? No   Do you have difficulty moving around from place to place? No   Do you have trouble with steps or getting out of a bed or a chair?  No   Current Diet Well Balanced Diet   Dental Exam Up to date   Eye Exam Up to date   Exercise (times per week) 0 times per week   Current Exercises Include No Regular Exercise   Current Exercise Activities Include -   Do you need help using the phone?  No   Are you deaf or do you have serious difficulty hearing?  Yes   Do you need help with transportation? No   Do you need help shopping? No   Do you need help preparing meals?  No   Do you need help with housework?  No   Do you need help with laundry? No   Do you need help taking your medications? No   Do you need help managing money? No   Do you ever drive or ride in a car without wearing a seat belt? No   Have you felt unusual stress, anger or loneliness in the last month? No   Who do you live with? Spouse   If you need help, do you have trouble finding someone available to you? No   Have you been bothered in the last four weeks by sexual problems? No   Do you have difficulty concentrating, remembering or making decisions? No       Age-appropriate Screening Schedule:  Refer to the list below for future screening recommendations based on patient's age, sex and/or medical conditions. Orders for these recommended tests are listed in the plan section. The patient has been provided with a written plan.    Health Maintenance   Topic Date Due   • LIPID PANEL  11/14/2018   • ZOSTER VACCINE (1 of 2) 11/03/2022 (Originally 6/16/1990)   • INFLUENZA VACCINE  03/31/2023 (Originally 8/1/2022)   • TDAP/TD VACCINES (2 - Tdap) 07/13/2025              Assessment & Plan   CMS Preventative Services Quick Reference  Risk Factors Identified During Encounter  Obesity/Overweight   The above risks/problems have been discussed with the patient.  Follow up actions/plans if indicated are seen below in the Assessment/Plan Section.  Pertinent information has been shared with the patient in the After Visit Summary.    Diagnoses and all orders for this visit:    1. Medicare annual wellness  visit, subsequent (Primary)    2. Bipolar depression (HCC)  -     CBC (No Diff)  -     Comprehensive Metabolic Panel  -     Hemoglobin A1c  -     Lipid Panel    3. Platelets decreased (HCC)  -     CBC (No Diff)    4. Microangiopathy (HCC)  Comments:  2015 brain imaging revealed chronic microvascular ischemic changes  Orders:  -     Lipid Panel    5. Medication monitoring encounter  -     CBC (No Diff)  -     Comprehensive Metabolic Panel  -     Hemoglobin A1c  -     Lipid Panel  -     TSH+Free T4  -     Vitamin B12  -     Folate  -     Vitamin D,25-Hydroxy    6. Abnormal blood chemistry  -     CBC (No Diff)  -     Comprehensive Metabolic Panel  -     Hemoglobin A1c  -     Lipid Panel  -     TSH+Free T4  -     Vitamin B12  -     Folate  -     Vitamin D,25-Hydroxy    7. Familial combined hyperlipidemia  -     Comprehensive Metabolic Panel  -     Lipid Panel    8. Overweight with body mass index (BMI) of 28 to 28.9 in adult  Comments:  information on mediterranean diet via avs     9. B12 deficiency  -     CBC (No Diff)  -     Vitamin B12  -     Folate    10. Abnormal findings on diagnostic imaging of other specified body structures  -     TSH+Free T4    11. Vitamin D deficiency, unspecified  -     Vitamin D,25-Hydroxy        Follow Up:   Return in about 1 year (around 11/6/2023) for Medicare Wellness, SOONER IF NEEDED.     An After Visit Summary and PPPS were made available to the patient.

## 2022-11-03 NOTE — PATIENT INSTRUCTIONS
Medicare Wellness  Personal Prevention Plan of Service     Date of Office Visit:    Encounter Provider:  Karena Spivey MD  Place of Service:  [facility]  Patient Name: Sharifa Mercer  :  1940    As part of the Medicare Wellness portion of your visit today, we are providing you with this personalized preventive plan of services (PPPS). This plan is based upon recommendations of the [place] Preventive Services Task Force (USPSTF) and the Advisory Committee on Immunization Practices (ACIP).    This lists the preventive care services that should be considered, and provides dates of when you are due. Items listed as completed are up-to-date and do not require any further intervention.    Health Maintenance   Topic Date Due    LIPID PANEL  2018    ANNUAL WELLNESS VISIT  2022    ZOSTER VACCINE (1 of 2) 2022 (Originally 1988)    COVID-19 Vaccine (1) 2022 (Originally 1940)    INFLUENZA VACCINE  2023 (Originally 2022)    TDAP/TD VACCINES (2 - Tdap) 2025    Pneumococcal Vaccine 65+  Completed       No orders of the defined types were placed in this encounter.      Return in about 1 year (around 2023) for Medicare Wellness, SOONER IF NEEDED.

## 2022-11-04 DIAGNOSIS — D69.6 PLATELETS DECREASED: Primary | ICD-10-CM

## 2022-11-04 LAB
25(OH)D3+25(OH)D2 SERPL-MCNC: 60.1 NG/ML (ref 30–100)
ALBUMIN SERPL-MCNC: 4 G/DL (ref 3.5–5.2)
ALBUMIN/GLOB SERPL: 2.2 G/DL
ALP SERPL-CCNC: 86 U/L (ref 39–117)
ALT SERPL-CCNC: 16 U/L (ref 1–41)
AST SERPL-CCNC: 26 U/L (ref 1–40)
BILIRUB SERPL-MCNC: 0.7 MG/DL (ref 0–1.2)
BUN SERPL-MCNC: 12 MG/DL (ref 8–23)
BUN/CREAT SERPL: 11.9 (ref 7–25)
CALCIUM SERPL-MCNC: 8.7 MG/DL (ref 8.6–10.5)
CHLORIDE SERPL-SCNC: 108 MMOL/L (ref 98–107)
CHOLEST SERPL-MCNC: 117 MG/DL (ref 0–200)
CO2 SERPL-SCNC: 25.6 MMOL/L (ref 22–29)
CREAT SERPL-MCNC: 1.01 MG/DL (ref 0.76–1.27)
EGFRCR SERPLBLD CKD-EPI 2021: 73.3 ML/MIN/1.73
ERYTHROCYTE [DISTWIDTH] IN BLOOD BY AUTOMATED COUNT: 11.6 % (ref 12.3–15.4)
FOLATE SERPL-MCNC: >20 NG/ML (ref 4.78–24.2)
GLOBULIN SER CALC-MCNC: 1.8 GM/DL
GLUCOSE SERPL-MCNC: 105 MG/DL (ref 65–99)
HBA1C MFR BLD: 4.9 % (ref 4.8–5.6)
HCT VFR BLD AUTO: 37.6 % (ref 37.5–51)
HDLC SERPL-MCNC: 62 MG/DL (ref 40–60)
HGB BLD-MCNC: 13.1 G/DL (ref 13–17.7)
LDLC SERPL CALC-MCNC: 40 MG/DL (ref 0–100)
MCH RBC QN AUTO: 32.8 PG (ref 26.6–33)
MCHC RBC AUTO-ENTMCNC: 34.8 G/DL (ref 31.5–35.7)
MCV RBC AUTO: 94 FL (ref 79–97)
PLATELET # BLD AUTO: 135 10*3/MM3 (ref 140–450)
POTASSIUM SERPL-SCNC: 4.1 MMOL/L (ref 3.5–5.2)
PROT SERPL-MCNC: 5.8 G/DL (ref 6–8.5)
RBC # BLD AUTO: 4 10*6/MM3 (ref 4.14–5.8)
SODIUM SERPL-SCNC: 140 MMOL/L (ref 136–145)
T4 FREE SERPL-MCNC: 1.22 NG/DL (ref 0.93–1.7)
TRIGL SERPL-MCNC: 71 MG/DL (ref 0–150)
TSH SERPL DL<=0.005 MIU/L-ACNC: 1.92 UIU/ML (ref 0.27–4.2)
VIT B12 SERPL-MCNC: 909 PG/ML (ref 211–946)
VLDLC SERPL CALC-MCNC: 15 MG/DL (ref 5–40)
WBC # BLD AUTO: 5.01 10*3/MM3 (ref 3.4–10.8)

## 2022-11-04 NOTE — PROGRESS NOTES
Platelet count lower than previous check. We need to keep an eye on this. Watch for any issues with excessive bleeding/bruising. I want him to have this level rechecked in a month, order is in, doesn't require fasting.    Rest of labs are normal.

## 2023-06-26 PROBLEM — R01.1 MURMUR, CARDIAC: Status: ACTIVE | Noted: 2023-06-26

## 2023-08-10 ENCOUNTER — OFFICE VISIT (OUTPATIENT)
Dept: CARDIOLOGY | Facility: CLINIC | Age: 83
End: 2023-08-10
Payer: MEDICARE

## 2023-08-10 VITALS
OXYGEN SATURATION: 96 % | BODY MASS INDEX: 25.27 KG/M2 | DIASTOLIC BLOOD PRESSURE: 62 MMHG | SYSTOLIC BLOOD PRESSURE: 110 MMHG | HEIGHT: 67 IN | HEART RATE: 60 BPM | WEIGHT: 161 LBS

## 2023-08-10 DIAGNOSIS — I63.81 LACUNAR INFARCTION: ICD-10-CM

## 2023-08-10 DIAGNOSIS — E78.5 DYSLIPIDEMIA: ICD-10-CM

## 2023-08-10 DIAGNOSIS — R01.1 MURMUR, CARDIAC: Primary | ICD-10-CM

## 2023-08-10 PROCEDURE — 1160F RVW MEDS BY RX/DR IN RCRD: CPT | Performed by: INTERNAL MEDICINE

## 2023-08-10 PROCEDURE — 99213 OFFICE O/P EST LOW 20 MIN: CPT | Performed by: INTERNAL MEDICINE

## 2023-08-10 PROCEDURE — 1159F MED LIST DOCD IN RCRD: CPT | Performed by: INTERNAL MEDICINE

## 2023-08-10 RX ORDER — SUCRALFATE 1 G/1
TABLET ORAL
COMMUNITY

## 2023-08-10 RX ORDER — BUTALBITAL, ACETAMINOPHEN AND CAFFEINE 50; 325; 40 MG/1; MG/1; MG/1
TABLET ORAL
COMMUNITY

## 2023-08-10 NOTE — PROGRESS NOTES
Subjective:     Encounter Date:08/10/2023      Patient ID: Sharifa Mercer is a 83 y.o. male.    Chief Complaint: Murmur  HPI    The following portions of the patient's history were reviewed and updated as appropriate: allergies, current medications, past family history, past medical history, past social history, past surgical history and problem  Review of Systems   Constitutional: Negative for chills, diaphoresis, fever, malaise/fatigue, weight gain and weight loss.   HENT:  Negative for ear discharge, hearing loss, hoarse voice and nosebleeds.    Eyes:  Negative for discharge, double vision, pain and photophobia.   Cardiovascular:  Negative for chest pain, claudication, cyanosis, dyspnea on exertion, irregular heartbeat, leg swelling, near-syncope, orthopnea, palpitations, paroxysmal nocturnal dyspnea and syncope.   Respiratory:  Negative for cough, hemoptysis, sputum production and wheezing.    Endocrine: Negative for cold intolerance, heat intolerance, polydipsia, polyphagia and polyuria.   Hematologic/Lymphatic: Negative for adenopathy and bleeding problem. Does not bruise/bleed easily.   Skin:  Negative for color change, flushing, itching and rash.   Musculoskeletal:  Negative for muscle cramps, muscle weakness, myalgias and stiffness.   Gastrointestinal:  Negative for abdominal pain, diarrhea, hematemesis, hematochezia, nausea and vomiting.   Genitourinary:  Negative for dysuria, frequency and nocturia.   Neurological:  Negative for focal weakness, loss of balance, numbness, paresthesias and seizures.   Psychiatric/Behavioral:  Negative for altered mental status, hallucinations and suicidal ideas.    Allergic/Immunologic: Negative for HIV exposure, hives and persistent infections.         Current Outpatient Medications:     aspirin 81 MG EC tablet, Take 1 tablet by mouth Daily., Disp: , Rfl:     atorvastatin (LIPITOR) 40 MG tablet, Take 1 tablet by mouth Every Night., Disp: , Rfl:      butalbital-acetaminophen-caffeine (FIORICET, ESGIC) -40 MG per tablet, TAKE ONE (1) TABLET BY MOUTH EVERY 4 HOURS AS NEEDED, Disp: , Rfl:     Iron Combinations (IRON COMPLEX PO), Take  by mouth., Disp: , Rfl:     LORAZEPAM PO, Take  by mouth., Disp: , Rfl:     Misc. Devices (Fingertip Pulse Oximeter) misc, 1 each Daily., Disp: 1 each, Rfl: 0    nortriptyline (PAMELOR) 25 MG capsule, Take 1 capsule by mouth Every Night., Disp: , Rfl:     OLANZapine-FLUoxetine HCl (SYMBYAX PO), Take  by mouth., Disp: , Rfl:     sucralfate (CARAFATE) 1 g tablet, 1 tablet qid as needed, Disp: , Rfl:     tamsulosin (FLOMAX) 0.4 MG capsule 24 hr capsule, Take 1 capsule by mouth Daily., Disp: , Rfl:     Topiramate (TOPAMAX PO), Take  by mouth., Disp: , Rfl:     vitamin B-12 (CYANOCOBALAMIN) 1000 MCG tablet, Take 1 tablet by mouth Daily., Disp: , Rfl:     Objective:   Vitals and nursing note reviewed.   Constitutional:       Appearance: Healthy appearance. Not in distress.   Neck:      Vascular: No JVR. JVD normal.   Pulmonary:      Effort: Pulmonary effort is normal.      Breath sounds: Normal breath sounds. No wheezing. No rhonchi. No rales.   Chest:      Chest wall: Not tender to palpatation.   Cardiovascular:      PMI at left midclavicular line. Normal rate. Regular rhythm. Normal S1. Normal S2.       Murmurs: There is a grade 2/6 mid frequency harsh crescendo-decrescendo midsystolic murmur at the URSB.      No gallop.  No click. No rub.   Pulses:     Intact distal pulses.   Edema:     Peripheral edema absent.   Abdominal:      General: Bowel sounds are normal.      Palpations: Abdomen is soft.      Tenderness: There is no abdominal tenderness.   Musculoskeletal: Normal range of motion.         General: No tenderness. Skin:     General: Skin is warm and dry.   Neurological:      General: No focal deficit present.      Mental Status: Alert and oriented to person, place and time.     Blood pressure 110/62, pulse 60, height 170.2 cm  "(67\"), weight 73 kg (161 lb), SpO2 96 %.   Lab Review:     Assessment:       1. Murmur, cardiac  Echocardiogram demonstrates mild aortic valve sclerosis without significant aortic stenosis.  Normal LV function.  Asymptomatic.    2. Lacunar infarction  No evidence of recurrence.  No residual deficit.    3. Dyslipidemia  Tolerating statin based cholesterol-lowering therapy without side effects.    Procedures    Plan:     Advance Care Planning   ACP discussion was held with the patient during this visit. Patient has an advance directive (not in EMR), copy requested.     No changes in medications have been made at today's visit.    Bacterial endocarditis prophylaxis precautions are not indicated.    No further cardiovascular testing is indicated.      "

## (undated) DEVICE — CANN IRR/INJ AIR ANT CHAMBER 6MM BEND 27G

## (undated) DEVICE — SYR LUERLOK 5CC

## (undated) DEVICE — CLEARCUT® HP2 SLIT KNIFE INTREPID MICRO-COAXIAL SYSTEM 2.4 DB: Brand: CLEARCUT®; INTREPID

## (undated) DEVICE — POST OP EYE CARE KIT: Brand: MEDLINE

## (undated) DEVICE — 15 DEG. MICROKNIFE - 3MM: Brand: SHARPOINT

## (undated) DEVICE — GLV SURG SENSICARE W/ALOE PF LF 8 STRL

## (undated) DEVICE — SOL IRRIG H2O 1000ML STRL

## (undated) DEVICE — HP CONCL INTREPID COAX I/A CRV .3MM

## (undated) DEVICE — Device